# Patient Record
Sex: FEMALE | Race: WHITE | NOT HISPANIC OR LATINO | Employment: FULL TIME
[De-identification: names, ages, dates, MRNs, and addresses within clinical notes are randomized per-mention and may not be internally consistent; named-entity substitution may affect disease eponyms.]

---

## 2017-02-16 ENCOUNTER — HOSPITAL (OUTPATIENT)
Dept: OTHER | Age: 62
End: 2017-02-16
Attending: NEUROLOGICAL SURGERY

## 2017-03-01 ENCOUNTER — HOSPITAL (OUTPATIENT)
Dept: OTHER | Age: 62
End: 2017-03-01
Attending: NEUROLOGICAL SURGERY

## 2017-04-01 ENCOUNTER — HOSPITAL (OUTPATIENT)
Dept: OTHER | Age: 62
End: 2017-04-01
Attending: NEUROLOGICAL SURGERY

## 2017-05-01 ENCOUNTER — HOSPITAL (OUTPATIENT)
Dept: OTHER | Age: 62
End: 2017-05-01
Attending: NEUROLOGICAL SURGERY

## 2017-06-01 ENCOUNTER — HOSPITAL (OUTPATIENT)
Dept: OTHER | Age: 62
End: 2017-06-01
Attending: NEUROLOGICAL SURGERY

## 2017-06-02 ENCOUNTER — HOSPITAL (OUTPATIENT)
Dept: OTHER | Age: 62
End: 2017-06-02
Attending: INTERNAL MEDICINE

## 2021-01-07 ENCOUNTER — LAB ENCOUNTER (OUTPATIENT)
Dept: LAB | Facility: HOSPITAL | Age: 66
End: 2021-01-07
Attending: SURGERY
Payer: COMMERCIAL

## 2021-01-07 ENCOUNTER — OFFICE VISIT (OUTPATIENT)
Dept: SURGERY | Facility: CLINIC | Age: 66
End: 2021-01-07
Payer: COMMERCIAL

## 2021-01-07 VITALS — HEIGHT: 65 IN | BODY MASS INDEX: 26.66 KG/M2 | WEIGHT: 160 LBS

## 2021-01-07 DIAGNOSIS — C50.911 MALIGNANT NEOPLASM OF RIGHT FEMALE BREAST, UNSPECIFIED ESTROGEN RECEPTOR STATUS, UNSPECIFIED SITE OF BREAST (HCC): Primary | ICD-10-CM

## 2021-01-07 DIAGNOSIS — C50.911 MALIGNANT NEOPLASM OF RIGHT FEMALE BREAST, UNSPECIFIED ESTROGEN RECEPTOR STATUS, UNSPECIFIED SITE OF BREAST (HCC): ICD-10-CM

## 2021-01-07 LAB
ALBUMIN SERPL-MCNC: 3.9 G/DL (ref 3.4–5)
ALBUMIN/GLOB SERPL: 0.9 {RATIO} (ref 1–2)
ALP LIVER SERPL-CCNC: 98 U/L
ALT SERPL-CCNC: 21 U/L
ANION GAP SERPL CALC-SCNC: 5 MMOL/L (ref 0–18)
AST SERPL-CCNC: 11 U/L (ref 15–37)
BILIRUB SERPL-MCNC: 0.4 MG/DL (ref 0.1–2)
BUN BLD-MCNC: 27 MG/DL (ref 7–18)
BUN/CREAT SERPL: 32.9 (ref 10–20)
CALCIUM BLD-MCNC: 9.5 MG/DL (ref 8.5–10.1)
CHLORIDE SERPL-SCNC: 108 MMOL/L (ref 98–112)
CO2 SERPL-SCNC: 28 MMOL/L (ref 21–32)
CREAT BLD-MCNC: 0.82 MG/DL
GLOBULIN PLAS-MCNC: 4.2 G/DL (ref 2.8–4.4)
GLUCOSE BLD-MCNC: 95 MG/DL (ref 70–99)
M PROTEIN MFR SERPL ELPH: 8.1 G/DL (ref 6.4–8.2)
OSMOLALITY SERPL CALC.SUM OF ELEC: 297 MOSM/KG (ref 275–295)
PATIENT FASTING Y/N/NP: NO
POTASSIUM SERPL-SCNC: 4 MMOL/L (ref 3.5–5.1)
SODIUM SERPL-SCNC: 141 MMOL/L (ref 136–145)

## 2021-01-07 PROCEDURE — 36415 COLL VENOUS BLD VENIPUNCTURE: CPT

## 2021-01-07 PROCEDURE — 80053 COMPREHEN METABOLIC PANEL: CPT

## 2021-01-07 PROCEDURE — 99245 OFF/OP CONSLTJ NEW/EST HI 55: CPT | Performed by: SURGERY

## 2021-01-07 PROCEDURE — 3008F BODY MASS INDEX DOCD: CPT | Performed by: SURGERY

## 2021-01-07 PROCEDURE — 82652 VIT D 1 25-DIHYDROXY: CPT

## 2021-01-07 NOTE — H&P
Chief complaint: Patient presents with:  Breast Cancer: Referred by Dr. Annabelle Caldwell for R BR CA      HPI: Valerie Estrada presents for consultation related to newly diagnosed breat ca  R breast 1:00 pos  Invasive ductal  ER Pr pos, Her 2 katiana neg, Ki 67 favorable lesions  EYES:denies blurred vision or double vision  HEENT: denies new nasal congestion, sinus pain or ST  LUNGS: denies shortness of breath with exertion  CARDIOVASCULAR: denies chest pain on exertion  GI: no hematemesis, no BRBPR, no worsening heartburn discussed her diagnosis breast cancer. We discussed the diagnosis, further work-up, treatment options, genetic risks and potential work up, surgical options including breast conservation and breast reconstruction, adjuvant treatment.   We discussed the risk

## 2021-01-07 NOTE — PROCEDURES
Procedure Note  The risks, benefits, alternatives and expected recovery were explained. The pt expressed understanding and wished to proceed with the procedure.       Preop: Right breast mass  Postop:  Same  Procedure: Excision of Right breast mass  Anesth

## 2021-01-08 ENCOUNTER — TELEPHONE (OUTPATIENT)
Dept: HEMATOLOGY/ONCOLOGY | Facility: HOSPITAL | Age: 66
End: 2021-01-08

## 2021-01-08 NOTE — TELEPHONE ENCOUNTER
Patient phoned asking for assistance with breast MRI scheduling. Patient was seen in the office of Dr. Rigoberto Leventhal previous evening for management of a new right breast cancer.   Patient shares she was diagnosed through the [address] [place] [address] system and was referred for her records as well as insurance authorization. Shared with patient that I am in contact with Dr.Kosik Sagar's RN regarding these concerns, and will contact patient for scheduling when able.     Patient has my contact information and I have enco

## 2021-01-09 LAB — 1,25-DIHYDROXYVITAMIN D: 70.2 PG/ML

## 2021-01-11 ENCOUNTER — TELEPHONE (OUTPATIENT)
Dept: HEMATOLOGY/ONCOLOGY | Facility: HOSPITAL | Age: 66
End: 2021-01-11

## 2021-01-11 NOTE — TELEPHONE ENCOUNTER
Phoned patient for continued care coordination. Shared with patient that insurance authorization is still pending on her breast MRI.   I have tentatively scheduled the exam for Friday 1/15/21 at 6pm.    Patient aware of my follow up this week regarding ins

## 2021-01-12 ENCOUNTER — NURSE NAVIGATOR ENCOUNTER (OUTPATIENT)
Dept: HEMATOLOGY/ONCOLOGY | Facility: HOSPITAL | Age: 66
End: 2021-01-12

## 2021-01-12 ENCOUNTER — LAB ENCOUNTER (OUTPATIENT)
Dept: LAB | Facility: HOSPITAL | Age: 66
End: 2021-01-12
Attending: SURGERY
Payer: COMMERCIAL

## 2021-01-12 DIAGNOSIS — C50.911 INVASIVE DUCTAL CARCINOMA OF BREAST, RIGHT (HCC): ICD-10-CM

## 2021-01-12 DIAGNOSIS — C50.911 INVASIVE DUCTAL CARCINOMA OF BREAST, RIGHT (HCC): Primary | ICD-10-CM

## 2021-01-12 PROCEDURE — 88321 CONSLTJ&REPRT SLD PREP ELSWR: CPT

## 2021-01-12 NOTE — PROGRESS NOTES
Order placed for pathology slide review of outside pathology slides of the right breast core biopsy, performed at [facility], per protocol.

## 2021-01-13 ENCOUNTER — TELEPHONE (OUTPATIENT)
Dept: HEMATOLOGY/ONCOLOGY | Facility: HOSPITAL | Age: 66
End: 2021-01-13

## 2021-01-13 ENCOUNTER — TELEPHONE (OUTPATIENT)
Dept: SURGERY | Facility: CLINIC | Age: 66
End: 2021-01-13

## 2021-01-13 NOTE — TELEPHONE ENCOUNTER
The following e mail message was received from patient:    Stephanie Carrier,     Any word from [facility] regarding the MRI? Should I make a call myself? I am very anxious and I know that is not what I should be feeling when also trying to fight cancer.  Thank you fo

## 2021-01-13 NOTE — TELEPHONE ENCOUNTER
Phoned patient after receiving e mail communication. Patient tearful, and verbalizing her struggle with her diagnosis, her separation from her spouse currently in [facility], and concerns related to increasing premiums on her insurance.     Emotional support p

## 2021-01-15 ENCOUNTER — TELEPHONE (OUTPATIENT)
Dept: HEMATOLOGY/ONCOLOGY | Facility: HOSPITAL | Age: 66
End: 2021-01-15

## 2021-01-15 ENCOUNTER — HOSPITAL ENCOUNTER (OUTPATIENT)
Dept: MRI IMAGING | Facility: HOSPITAL | Age: 66
Discharge: HOME | End: 2021-01-15
Attending: SURGERY
Payer: COMMERCIAL

## 2021-01-15 DIAGNOSIS — C50.911 MALIGNANT NEOPLASM OF RIGHT FEMALE BREAST, UNSPECIFIED ESTROGEN RECEPTOR STATUS, UNSPECIFIED SITE OF BREAST (HCC): ICD-10-CM

## 2021-01-15 PROCEDURE — A9575 INJ GADOTERATE MEGLUMI 0.1ML: HCPCS | Performed by: SURGERY

## 2021-01-15 PROCEDURE — 77049 MRI BREAST C-+ W/CAD BI: CPT | Performed by: SURGERY

## 2021-01-15 NOTE — TELEPHONE ENCOUNTER
Many back and fourth calls between myself, the patient and her insurance carrier [employer] working towards authorization for breast MRI. Authorization was obtained after the efforts of myself, patient and the  in the [facility].       Aut

## 2021-01-18 ENCOUNTER — OFFICE VISIT (OUTPATIENT)
Dept: SURGERY | Facility: CLINIC | Age: 66
End: 2021-01-18
Payer: COMMERCIAL

## 2021-01-18 VITALS — BODY MASS INDEX: 26.66 KG/M2 | WEIGHT: 160 LBS | HEIGHT: 65 IN

## 2021-01-18 DIAGNOSIS — C50.911 MALIGNANT NEOPLASM OF RIGHT FEMALE BREAST, UNSPECIFIED ESTROGEN RECEPTOR STATUS, UNSPECIFIED SITE OF BREAST (HCC): Primary | ICD-10-CM

## 2021-01-18 PROCEDURE — 3008F BODY MASS INDEX DOCD: CPT | Performed by: SURGERY

## 2021-01-18 PROCEDURE — 99214 OFFICE O/P EST MOD 30 MIN: CPT | Performed by: SURGERY

## 2021-01-18 NOTE — H&P
Chief complaint: Patient presents with:  Breast Cancer:  Follow up B BR MRI for R BR CA      HPI: Zeeshan Ortega presents for consultation related to newly diagnosed right breast ca  R breast 1:00 pos  Invasive ductal  ER Pr pos, Her 2 katiana neg, Ki 67 favorable  Bx p generally well  SKIN: no ulcerated or worrisome skin lesions  EYES:denies blurred vision or double vision  HEENT: denies new nasal congestion, sinus pain or ST  LUNGS: denies shortness of breath with exertion  CARDIOVASCULAR: denies chest pain on exertion Cancer    Plan R needle loc lumpectomy and SN procedure. Today the patient and I discussed her diagnosis breast cancer.  We discussed the diagnosis, further work-up, treatment options, genetic risks and potential work up, surgical options including carla

## 2021-01-26 ENCOUNTER — LAB ENCOUNTER (OUTPATIENT)
Dept: LAB | Age: 66
End: 2021-01-26
Attending: SURGERY
Payer: COMMERCIAL

## 2021-01-26 DIAGNOSIS — Z01.818 PREOP TESTING: ICD-10-CM

## 2021-01-27 ENCOUNTER — OFFICE VISIT (OUTPATIENT)
Dept: HEMATOLOGY/ONCOLOGY | Facility: HOSPITAL | Age: 66
End: 2021-01-27
Attending: INTERNAL MEDICINE
Payer: COMMERCIAL

## 2021-01-27 ENCOUNTER — TELEPHONE (OUTPATIENT)
Dept: HEMATOLOGY/ONCOLOGY | Facility: HOSPITAL | Age: 66
End: 2021-01-27

## 2021-01-27 VITALS
OXYGEN SATURATION: 98 % | WEIGHT: 158 LBS | BODY MASS INDEX: 26.33 KG/M2 | HEIGHT: 65 IN | RESPIRATION RATE: 16 BRPM | SYSTOLIC BLOOD PRESSURE: 124 MMHG | HEART RATE: 66 BPM | DIASTOLIC BLOOD PRESSURE: 74 MMHG | TEMPERATURE: 97 F

## 2021-01-27 DIAGNOSIS — C50.211 MALIGNANT NEOPLASM OF UPPER-INNER QUADRANT OF RIGHT BREAST IN FEMALE, ESTROGEN RECEPTOR POSITIVE (HCC): Primary | ICD-10-CM

## 2021-01-27 DIAGNOSIS — Z17.0 MALIGNANT NEOPLASM OF UPPER-INNER QUADRANT OF RIGHT BREAST IN FEMALE, ESTROGEN RECEPTOR POSITIVE (HCC): Primary | ICD-10-CM

## 2021-01-27 LAB — SARS-COV-2 RNA RESP QL NAA+PROBE: NOT DETECTED

## 2021-01-27 PROCEDURE — 99245 OFF/OP CONSLTJ NEW/EST HI 55: CPT | Performed by: INTERNAL MEDICINE

## 2021-01-27 NOTE — CONSULTS
HPI     Bette Mckenzie is a 60year old female here at the request of Stuart Pelayo MD, due to new diagnosis of Malignant neoplasm of upper-inner quadrant of right breast in female, estrogen receptor positive (hcc)  (primary encounter diagnosis)     Pa Breast density category C. Prior history of breast biopsies:  No.     ECOG PS 0      She is accompanied by herself      Review of Systems:   Review of Systems   Constitutional: Positive for fatigue.  Negative for appetite change and unexpected weight gomez Patient currently staying with Dr. Severino Corrales, who is a good friend. Patient's  is in [facility] and she is trying to get him to come and be with her. Patient staying with her daughters. Youngest son is a Fabiano in college in [place].       Family Hi Surgical options were reviewed. Discussed that she would be an excellent candidate for breast conservation with a lumpectomy followed by RT to complete local control.   Given her positive family history of breast cancer, genetic counseling was recommended, Communicated with Dr. Deann Kidd about genetic testing prior to surgery as she is scheduled for tomorrow. No orders of the defined types were placed in this encounter.     MDM High risk    Results From Past 48 Hours:  Recent Results (from the past 48 hour Estrogen receptor (Leica, monoclonal, clone 6 F11) status: 95% (Positive, strong intensity)  Progesterone receptor (Leica, monoclonal, clone 16) status: 90% (Positive, strong  intensity)  HER-2/katiana (Leica, monoclonal, clone CB11) status: 0 (Negative)  Ki-6 IV administration of 15 ml of Dotarem. Subsequently, subtraction imaging and 3D reconstruction were completed on an independent workstation. ENHANCEMENT PATTERN:   Mild, with 25-50% of glandular tissue demonstrating enhancement.                  Sarina Chamber Dictated by (CST): Keny Perales DO on 1/18/2021 at 10:12 AM       Finalized by (CST): Gita [facility],  on 1/18/2021 at 10:34 AM          IMPRESSION:   Right breast focal asymmetry.      RECOMMENDATION: Additional diagnostic COMPARISON: 12/27/2013 through 6/29/2010      FINDINGS:   Patient underwent recent screening and diagnostic imaging. Slightly more  conspicuous appearance of focal asymmetry central inner right breast   allowing  for technique.  Parenchymal pattern bilatera Invasive Duct Carcinoma, Histologic Grade II/III (SBR)    Please refer to pathology report for further details. IMPRESSION:  Malignant pathology results detailed above. Surgical consultation   recommended  for excision.  Staging breast MRI is also consuelo Patient tolerated the procedure well without any immediate postprocedural  complications. The postbiopsy marking clip in the shape of a coil was deployed  at the biopsy site.  Extrinsic compression was applied directly over the biopsy  site until adequate h risks and complications include but are not limited to bleeding, infection,  hematoma formation, unsuccessful biopsy, clip migration and potential for  surgical intervention. This was discussed with the patient.  Patient voiced  understanding and agrees to Breast Prognostic - Ancillary Studies:- Estrogen receptors (ER):Positive (95% of   nuclear stain of malignant cells)     Intensity: strong  -Progesterone receptors (NJ): Positive (90% of nuclear stain of malignant cells)      Intensity: strong  -Ki-67 (Mib been cleared or approved by the [employer] and Drug Administration. The FDA has   determined that such clearance or approval  is not necessary. The test is used for clinical purposes. It should not be   regarded as investigational or for research.   This labor clones: ER-SP1, DC-1E2, HER2-4B5, Ki67-K2, p53-DO7, EGFR-3C6.         Electronically Signed By: Mayra Medeiros D.O. (SARAN) KELECHI 1/4/2021            Preoperative Diagnosis: Abnormal findings on diagnostic imaging of breast      Gross Description: RT aguirre

## 2021-01-27 NOTE — TELEPHONE ENCOUNTER
Phoned patient regarding referral for genetic testing from Dr. Dee Dee Lopez. Scheduled patient to meet with Kori Mendosa for tomorrow at 10:15am.  All appointment Information provided to patient. She acknowledged and denied questions.

## 2021-01-28 ENCOUNTER — GENETICS ENCOUNTER (OUTPATIENT)
Dept: GENETICS | Facility: HOSPITAL | Age: 66
End: 2021-01-28
Attending: INTERNAL MEDICINE
Payer: COMMERCIAL

## 2021-01-28 ENCOUNTER — NURSE ONLY (OUTPATIENT)
Dept: HEMATOLOGY/ONCOLOGY | Facility: HOSPITAL | Age: 66
End: 2021-01-28
Payer: COMMERCIAL

## 2021-01-28 PROCEDURE — 96040 HC GENETIC COUNSELING EA 30 MIN: CPT

## 2021-01-28 PROCEDURE — 36415 COLL VENOUS BLD VENIPUNCTURE: CPT

## 2021-01-28 NOTE — PROGRESS NOTES
Patient Name: Brenda Butcher  YOB: 1955  Date of Visit: 1/28/2021    Reason for visit: Ms. Sam Burgos was seen for the purposes of genetic counseling due to her recent diagnosis of breast at age 60 and a family history of breast cancer.  The purpose possible gynecological cancer at an unknown age, respectively. Another maternal female cousin (74) was diagnosed with lung cancer in her 49-58s (daughter of maternal aunt with nipple cancer).  A maternal male cousin possibly had an unknown cancer (son of un occur in both tumor suppressor genes of a particular cell during the course of their lifetime in order for malignant transformation to occur.   In individuals with hereditary breast or other related cancers, one of the tumor suppressor genes already carries cancers associated with the specific gene. Since mutations in most cancer susceptibility genes are inherited in an autosomal dominant fashion, siblings and children of individuals with a mutation have a 50% risk of carrying the mutation as well.       A neg individual tested is truly negative for the familial mutation or whether the familial mutation was unable to be detected by the genetic testing method used. In such cases, screening and follow-up should be guided by personal and family history.      Because

## 2021-01-29 ENCOUNTER — TELEPHONE (OUTPATIENT)
Dept: HEMATOLOGY/ONCOLOGY | Facility: HOSPITAL | Age: 66
End: 2021-01-29

## 2021-01-29 DIAGNOSIS — C50.911 MALIGNANT NEOPLASM OF RIGHT FEMALE BREAST, UNSPECIFIED ESTROGEN RECEPTOR STATUS, UNSPECIFIED SITE OF BREAST (HCC): Primary | ICD-10-CM

## 2021-01-29 NOTE — TELEPHONE ENCOUNTER
Call received from patient. She shares that after \"much thought and discussion with friends and family\", that she wishes to proceed with lumpectomy. Patient understands that genetic testing is pending.   She shares that should she be found to have a m

## 2021-02-01 ENCOUNTER — TELEPHONE (OUTPATIENT)
Dept: SURGERY | Facility: CLINIC | Age: 66
End: 2021-02-01

## 2021-02-01 ENCOUNTER — SOCIAL WORK SERVICES (OUTPATIENT)
Dept: HEMATOLOGY/ONCOLOGY | Facility: HOSPITAL | Age: 66
End: 2021-02-01

## 2021-02-01 NOTE — PROGRESS NOTES
VARGHESE spoke with the pt, along with RN Breast Navigator. Pt is requesting a letter to assist with expediting her passport application.  Prior to discovery of her breast cancer, pt intended to spend the spring in [facility] with her  who lives there [place] [facility]\" today. SW will f/u with the pt tomorrow to review what information is being requested. --  02/02: SW placed call to the pt, as previously planned. Message left, awaiting response.   --  02/03: Pt left voicemail indicating she has no upda

## 2021-02-02 DIAGNOSIS — Z17.0 MALIGNANT NEOPLASM OF UPPER-INNER QUADRANT OF RIGHT BREAST IN FEMALE, ESTROGEN RECEPTOR POSITIVE (HCC): Primary | ICD-10-CM

## 2021-02-02 DIAGNOSIS — C50.211 MALIGNANT NEOPLASM OF UPPER-INNER QUADRANT OF RIGHT BREAST IN FEMALE, ESTROGEN RECEPTOR POSITIVE (HCC): Primary | ICD-10-CM

## 2021-02-05 ENCOUNTER — APPOINTMENT (OUTPATIENT)
Dept: PHYSICAL THERAPY | Facility: HOSPITAL | Age: 66
End: 2021-02-05
Attending: SURGERY
Payer: COMMERCIAL

## 2021-02-05 ENCOUNTER — OFFICE VISIT (OUTPATIENT)
Dept: SURGERY | Facility: CLINIC | Age: 66
End: 2021-02-05
Payer: COMMERCIAL

## 2021-02-05 VITALS — WEIGHT: 158 LBS | HEIGHT: 65 IN | BODY MASS INDEX: 26.33 KG/M2

## 2021-02-05 DIAGNOSIS — C50.911 MALIGNANT NEOPLASM OF RIGHT FEMALE BREAST, UNSPECIFIED ESTROGEN RECEPTOR STATUS, UNSPECIFIED SITE OF BREAST (HCC): Primary | ICD-10-CM

## 2021-02-05 PROCEDURE — 99215 OFFICE O/P EST HI 40 MIN: CPT | Performed by: SURGERY

## 2021-02-05 PROCEDURE — 3008F BODY MASS INDEX DOCD: CPT | Performed by: SURGERY

## 2021-02-05 RX ORDER — VALACYCLOVIR HYDROCHLORIDE 500 MG/1
500 TABLET, FILM COATED ORAL DAILY
COMMUNITY
Start: 2021-01-18 | End: 2021-03-11

## 2021-02-05 NOTE — H&P
Chief complaint: R breast cancer    HPI: Cliff Benson presents today with her daughter Yanet Ramírez, after Ирина's consultation with Dr. Dee Dee Lopez regarding pt's right breast ca.     R breast 1:00 pos  Invasive ductal  ER Pr pos, Her 2 katiana neg, Ki 67 favorable  Bx performed at [facility]      Alcohol use: Yes        Comment: socially      Drug use: No       Family history:  Family History   Problem Relation Age of Onset   • Heart Disorder Father    • Stroke Father    • Breast Cancer Mother 46   • Heart Disorder Brother    • organomegaly noted, no masses, no hernias, no ascites. Extremities: no edema, cyanosis, or clubbing. No deformity. Musculoskeletal: normal appearance, no deformities, normal function. Back wnl, no CVA tenderness.   Neurological: exam appropriate for age r

## 2021-02-05 NOTE — H&P (VIEW-ONLY)
Chief complaint: R breast cancer    HPI: Adonay Sanchez presents today with her daughter Fan Acosta, after Ирниа's consultation with Dr. Watson Pastures regarding pt's right breast ca.     R breast 1:00 pos  Invasive ductal  ER Pr pos, Her 2 katiana neg, Ki 67 favorable  Bx performed at [facility]      Alcohol use: Yes        Comment: socially      Drug use: No       Family history:  Family History   Problem Relation Age of Onset   • Heart Disorder Father    • Stroke Father    • Breast Cancer Mother 46   • Heart Disorder Brother    • organomegaly noted, no masses, no hernias, no ascites. Extremities: no edema, cyanosis, or clubbing. No deformity. Musculoskeletal: normal appearance, no deformities, normal function. Back wnl, no CVA tenderness.   Neurological: exam appropriate for age r

## 2021-02-08 ENCOUNTER — TELEPHONE (OUTPATIENT)
Dept: HEMATOLOGY/ONCOLOGY | Facility: HOSPITAL | Age: 66
End: 2021-02-08

## 2021-02-08 ENCOUNTER — NURSE NAVIGATOR ENCOUNTER (OUTPATIENT)
Dept: HEMATOLOGY/ONCOLOGY | Facility: HOSPITAL | Age: 66
End: 2021-02-08

## 2021-02-08 NOTE — TELEPHONE ENCOUNTER
Called the patient to clarify several of her questions. D/w patient that the biopsy that she had did not make her cancer spread or grow.     D/w the patient that her genetic tests were negative and that she is a great candidate for a lumpectomy with slnb

## 2021-02-08 NOTE — TELEPHONE ENCOUNTER
Ms. Michael Nails was informed of STAT genetic test results via telephone on 2/8/2021.  was negative for any pathogenic mutations in the 9 genes evaluated in the [facility] Breast Cancer STAT Panel (JOHNATHAN, BRCA1, BRCA2, CDH1, CHEK2, PALB2, PTEN, STK11, TP53).  A

## 2021-02-08 NOTE — TELEPHONE ENCOUNTER
The following email message was received from patient on 2/8/21 at 3:50pm:  As I mentioned this morning at 8 AM, I have been very disappointed about the delay in my lumpectomy.  While I understand Dr. Dee Dee Lopez was looking at the longer-term, bigger picture of ge during which she relayed her wish to proceed with scheduling lumpectomy regardless of the genetic testing outcome, that this was also communicated with her Care Team to include Dr. Sarah Monreal, her RN Malachi Giles, as well as Dr. Kimo Child.     Patient shares she spoke wit

## 2021-02-08 NOTE — PROGRESS NOTES
Received phone call from pt stating that she spoke to Dr. Neptali Garcia and they both agreed that pt's lumpectomy should be moved closer with her surgical date.   Allowed pt to vent frustrations about her surgical date stating that she spoke with Dr. Neptali Garcia stating

## 2021-02-09 ENCOUNTER — TELEPHONE (OUTPATIENT)
Dept: HEMATOLOGY/ONCOLOGY | Facility: HOSPITAL | Age: 66
End: 2021-02-09

## 2021-02-09 ENCOUNTER — OFFICE VISIT (OUTPATIENT)
Dept: PHYSICAL THERAPY | Facility: HOSPITAL | Age: 66
End: 2021-02-09
Payer: COMMERCIAL

## 2021-02-09 NOTE — TELEPHONE ENCOUNTER
Tried to call patient with results of reflex genetic testing, patient didn't answer and I was unable to LM due to full VM inbox. Will try to call patient tomorrow.

## 2021-02-09 NOTE — PATIENT INSTRUCTIONS
Instructions:    1. CALL INSURANCE AND ASK WHAT VENDOR TO USE FOR RIGHT UPPER EXTREMITY COMPRESSION SLEEVE AND GAUNTLET (These are considered DME Equipment)    2.  IF [employer] and [address] [place] [address] call them and ask how long it would take to get an OFF THE SHELF or YOON

## 2021-02-09 NOTE — PROGRESS NOTES
BREAST CANCER SURGICAL SCREENINGS  [facility]      PATIENT SUMMARY:     Involved Side:   RIGHT                                                 Dominant Hand:   RIGHT                               Occupation:   Writer, helping with in- Shoulder strength  Right Left  Shoulder strength  Right Left  Shoulder strength  Right Left    flex 5 5   flex     flex      abd 5 5   abd     abd      ext 5 5   ext     ext      ER 5 5   ER     ER      IR 5 5   IR     IR

## 2021-02-10 ENCOUNTER — TELEPHONE (OUTPATIENT)
Dept: SURGERY | Facility: CLINIC | Age: 66
End: 2021-02-10

## 2021-02-10 ENCOUNTER — GENETICS ENCOUNTER (OUTPATIENT)
Dept: HEMATOLOGY/ONCOLOGY | Facility: HOSPITAL | Age: 66
End: 2021-02-10

## 2021-02-10 DIAGNOSIS — C50.911 MALIGNANT NEOPLASM OF RIGHT FEMALE BREAST, UNSPECIFIED ESTROGEN RECEPTOR STATUS, UNSPECIFIED SITE OF BREAST (HCC): Primary | ICD-10-CM

## 2021-02-10 NOTE — PROGRESS NOTES
Patient Name: Ender Bhatt  YOB: 1955    Referring Provider:  Nehemiah Gregory MD          Reason for Referral:  Ms. Jacy Damian had STAT genetic testing performed on 1/28/2021 because of a recent diagnosis of breast cancer and a family history of start at an earlier age than recommended for the general population. All medical management decisions should be made with a physician. Ms. Deloris Luo was found to have a FLCN [id number]>A ([id number]) VUS.  A variant of uncertain significance (VUS) means that

## 2021-02-12 RX ORDER — ACETAMINOPHEN 500 MG
500 TABLET ORAL EVERY 6 HOURS PRN
COMMUNITY
End: 2021-04-16

## 2021-02-13 ENCOUNTER — LAB ENCOUNTER (OUTPATIENT)
Dept: LAB | Facility: HOSPITAL | Age: 66
End: 2021-02-13
Attending: SURGERY
Payer: COMMERCIAL

## 2021-02-13 DIAGNOSIS — Z01.818 PREOP TESTING: ICD-10-CM

## 2021-02-13 LAB — SARS-COV-2 RNA RESP QL NAA+PROBE: NOT DETECTED

## 2021-02-15 ENCOUNTER — TELEPHONE (OUTPATIENT)
Dept: HEMATOLOGY/ONCOLOGY | Facility: HOSPITAL | Age: 66
End: 2021-02-15

## 2021-02-15 NOTE — TELEPHONE ENCOUNTER
Patient is scheduled 2/16/21 for right breast wire localized lumpectomy and sentinel lymph node biopsy with Dr. Kamini Crane. Phoned patient to provide emotional support as well as address any questions or concerns related to her scheduled surgery.     [facility]

## 2021-02-16 ENCOUNTER — HOSPITAL ENCOUNTER (OUTPATIENT)
Facility: HOSPITAL | Age: 66
Setting detail: HOSPITAL OUTPATIENT SURGERY
Discharge: HOME | End: 2021-02-16
Attending: SURGERY | Admitting: SURGERY
Payer: COMMERCIAL

## 2021-02-16 ENCOUNTER — ANESTHESIA EVENT (OUTPATIENT)
Dept: SURGERY | Facility: HOSPITAL | Age: 66
End: 2021-02-16
Payer: COMMERCIAL

## 2021-02-16 ENCOUNTER — ANESTHESIA (OUTPATIENT)
Dept: SURGERY | Facility: HOSPITAL | Age: 66
End: 2021-02-16
Payer: COMMERCIAL

## 2021-02-16 ENCOUNTER — APPOINTMENT (OUTPATIENT)
Dept: MAMMOGRAPHY | Facility: HOSPITAL | Age: 66
End: 2021-02-16
Attending: SURGERY
Payer: COMMERCIAL

## 2021-02-16 ENCOUNTER — HOSPITAL ENCOUNTER (OUTPATIENT)
Dept: ULTRASOUND IMAGING | Facility: HOSPITAL | Age: 66
Discharge: HOME | End: 2021-02-16
Attending: SURGERY
Payer: COMMERCIAL

## 2021-02-16 ENCOUNTER — HOSPITAL ENCOUNTER (OUTPATIENT)
Dept: MAMMOGRAPHY | Facility: HOSPITAL | Age: 66
Discharge: HOME | End: 2021-02-16
Attending: SURGERY
Payer: COMMERCIAL

## 2021-02-16 ENCOUNTER — HOSPITAL ENCOUNTER (OUTPATIENT)
Dept: NUCLEAR MEDICINE | Facility: HOSPITAL | Age: 66
Discharge: HOME | End: 2021-02-16
Attending: SURGERY
Payer: COMMERCIAL

## 2021-02-16 VITALS
TEMPERATURE: 98 F | RESPIRATION RATE: 16 BRPM | SYSTOLIC BLOOD PRESSURE: 140 MMHG | OXYGEN SATURATION: 98 % | BODY MASS INDEX: 26.66 KG/M2 | WEIGHT: 160 LBS | HEIGHT: 65 IN | HEART RATE: 72 BPM | DIASTOLIC BLOOD PRESSURE: 73 MMHG

## 2021-02-16 DIAGNOSIS — Z01.818 PREOP TESTING: Primary | ICD-10-CM

## 2021-02-16 DIAGNOSIS — C50.911 MALIGNANT NEOPLASM OF RIGHT FEMALE BREAST, UNSPECIFIED ESTROGEN RECEPTOR STATUS, UNSPECIFIED SITE OF BREAST (HCC): ICD-10-CM

## 2021-02-16 PROCEDURE — 19285 PERQ DEV BREAST 1ST US IMAG: CPT | Performed by: SURGERY

## 2021-02-16 PROCEDURE — A4648 IMPLANTABLE TISSUE MARKER: HCPCS

## 2021-02-16 PROCEDURE — 19301 PARTIAL MASTECTOMY: CPT | Performed by: SURGERY

## 2021-02-16 PROCEDURE — 78195 LYMPH SYSTEM IMAGING: CPT | Performed by: SURGERY

## 2021-02-16 PROCEDURE — 38525 BIOPSY/REMOVAL LYMPH NODES: CPT | Performed by: SURGERY

## 2021-02-16 PROCEDURE — 38900 IO MAP OF SENT LYMPH NODE: CPT | Performed by: SURGERY

## 2021-02-16 PROCEDURE — 0HBT0ZZ EXCISION OF RIGHT BREAST, OPEN APPROACH: ICD-10-PCS | Performed by: SURGERY

## 2021-02-16 PROCEDURE — 76098 X-RAY EXAM SURGICAL SPECIMEN: CPT | Performed by: SURGERY

## 2021-02-16 PROCEDURE — 77065 DX MAMMO INCL CAD UNI: CPT | Performed by: SURGERY

## 2021-02-16 RX ORDER — SODIUM CHLORIDE, SODIUM LACTATE, POTASSIUM CHLORIDE, CALCIUM CHLORIDE 600; 310; 30; 20 MG/100ML; MG/100ML; MG/100ML; MG/100ML
INJECTION, SOLUTION INTRAVENOUS CONTINUOUS
Status: DISCONTINUED | OUTPATIENT
Start: 2021-02-16 | End: 2021-02-16

## 2021-02-16 RX ORDER — MORPHINE SULFATE 10 MG/ML
6 INJECTION, SOLUTION INTRAMUSCULAR; INTRAVENOUS EVERY 10 MIN PRN
Status: DISCONTINUED | OUTPATIENT
Start: 2021-02-16 | End: 2021-02-16

## 2021-02-16 RX ORDER — MORPHINE SULFATE 4 MG/ML
4 INJECTION, SOLUTION INTRAMUSCULAR; INTRAVENOUS EVERY 10 MIN PRN
Status: DISCONTINUED | OUTPATIENT
Start: 2021-02-16 | End: 2021-02-16

## 2021-02-16 RX ORDER — MIDAZOLAM HYDROCHLORIDE 1 MG/ML
INJECTION INTRAMUSCULAR; INTRAVENOUS AS NEEDED
Status: DISCONTINUED | OUTPATIENT
Start: 2021-02-16 | End: 2021-02-16 | Stop reason: SURG

## 2021-02-16 RX ORDER — DEXAMETHASONE SODIUM PHOSPHATE 4 MG/ML
VIAL (ML) INJECTION AS NEEDED
Status: DISCONTINUED | OUTPATIENT
Start: 2021-02-16 | End: 2021-02-16 | Stop reason: SURG

## 2021-02-16 RX ORDER — HYDROCODONE BITARTRATE AND ACETAMINOPHEN 5; 325 MG/1; MG/1
1 TABLET ORAL EVERY 4 HOURS PRN
Status: CANCELLED | OUTPATIENT
Start: 2021-02-16

## 2021-02-16 RX ORDER — HYDROMORPHONE HYDROCHLORIDE 1 MG/ML
0.6 INJECTION, SOLUTION INTRAMUSCULAR; INTRAVENOUS; SUBCUTANEOUS EVERY 5 MIN PRN
Status: DISCONTINUED | OUTPATIENT
Start: 2021-02-16 | End: 2021-02-16

## 2021-02-16 RX ORDER — BUPIVACAINE HYDROCHLORIDE AND EPINEPHRINE 2.5; 5 MG/ML; UG/ML
INJECTION, SOLUTION INFILTRATION; PERINEURAL AS NEEDED
Status: DISCONTINUED | OUTPATIENT
Start: 2021-02-16 | End: 2021-02-16 | Stop reason: HOSPADM

## 2021-02-16 RX ORDER — MORPHINE SULFATE 4 MG/ML
6 INJECTION, SOLUTION INTRAMUSCULAR; INTRAVENOUS EVERY 2 HOUR PRN
Status: CANCELLED | OUTPATIENT
Start: 2021-02-16

## 2021-02-16 RX ORDER — HALOPERIDOL 5 MG/ML
0.25 INJECTION INTRAMUSCULAR ONCE AS NEEDED
Status: DISCONTINUED | OUTPATIENT
Start: 2021-02-16 | End: 2021-02-16

## 2021-02-16 RX ORDER — MORPHINE SULFATE 4 MG/ML
2 INJECTION, SOLUTION INTRAMUSCULAR; INTRAVENOUS EVERY 2 HOUR PRN
Status: CANCELLED | OUTPATIENT
Start: 2021-02-16

## 2021-02-16 RX ORDER — LIDOCAINE HYDROCHLORIDE 10 MG/ML
INJECTION, SOLUTION EPIDURAL; INFILTRATION; INTRACAUDAL; PERINEURAL AS NEEDED
Status: DISCONTINUED | OUTPATIENT
Start: 2021-02-16 | End: 2021-02-16 | Stop reason: SURG

## 2021-02-16 RX ORDER — NALOXONE HYDROCHLORIDE 0.4 MG/ML
80 INJECTION, SOLUTION INTRAMUSCULAR; INTRAVENOUS; SUBCUTANEOUS AS NEEDED
Status: DISCONTINUED | OUTPATIENT
Start: 2021-02-16 | End: 2021-02-16

## 2021-02-16 RX ORDER — ONDANSETRON 2 MG/ML
4 INJECTION INTRAMUSCULAR; INTRAVENOUS ONCE AS NEEDED
Status: COMPLETED | OUTPATIENT
Start: 2021-02-16 | End: 2021-02-16

## 2021-02-16 RX ORDER — HYDROCODONE BITARTRATE AND ACETAMINOPHEN 5; 325 MG/1; MG/1
1 TABLET ORAL EVERY 6 HOURS PRN
Qty: 20 TABLET | Refills: 0 | Status: SHIPPED | OUTPATIENT
Start: 2021-02-16 | End: 2021-04-05 | Stop reason: ALTCHOICE

## 2021-02-16 RX ORDER — ONDANSETRON 2 MG/ML
INJECTION INTRAMUSCULAR; INTRAVENOUS AS NEEDED
Status: DISCONTINUED | OUTPATIENT
Start: 2021-02-16 | End: 2021-02-16 | Stop reason: SURG

## 2021-02-16 RX ORDER — HYDROCODONE BITARTRATE AND ACETAMINOPHEN 5; 325 MG/1; MG/1
1 TABLET ORAL EVERY 6 HOURS PRN
Qty: 6 TABLET | Refills: 0 | Status: SHIPPED | OUTPATIENT
Start: 2021-02-16 | End: 2021-03-02

## 2021-02-16 RX ORDER — HYDROCODONE BITARTRATE AND ACETAMINOPHEN 5; 325 MG/1; MG/1
2 TABLET ORAL EVERY 4 HOURS PRN
Status: CANCELLED | OUTPATIENT
Start: 2021-02-16

## 2021-02-16 RX ORDER — CLINDAMYCIN PHOSPHATE 900 MG/50ML
900 INJECTION INTRAVENOUS ONCE
Status: COMPLETED | OUTPATIENT
Start: 2021-02-16 | End: 2021-02-16

## 2021-02-16 RX ORDER — ACETAMINOPHEN 325 MG/1
650 TABLET ORAL EVERY 4 HOURS PRN
Status: CANCELLED | OUTPATIENT
Start: 2021-02-16

## 2021-02-16 RX ORDER — PROCHLORPERAZINE EDISYLATE 5 MG/ML
5 INJECTION INTRAMUSCULAR; INTRAVENOUS ONCE AS NEEDED
Status: DISCONTINUED | OUTPATIENT
Start: 2021-02-16 | End: 2021-02-16

## 2021-02-16 RX ORDER — HYDROMORPHONE HYDROCHLORIDE 1 MG/ML
0.2 INJECTION, SOLUTION INTRAMUSCULAR; INTRAVENOUS; SUBCUTANEOUS EVERY 5 MIN PRN
Status: DISCONTINUED | OUTPATIENT
Start: 2021-02-16 | End: 2021-02-16

## 2021-02-16 RX ORDER — MORPHINE SULFATE 4 MG/ML
2 INJECTION, SOLUTION INTRAMUSCULAR; INTRAVENOUS EVERY 10 MIN PRN
Status: DISCONTINUED | OUTPATIENT
Start: 2021-02-16 | End: 2021-02-16

## 2021-02-16 RX ORDER — HYDROMORPHONE HYDROCHLORIDE 1 MG/ML
0.4 INJECTION, SOLUTION INTRAMUSCULAR; INTRAVENOUS; SUBCUTANEOUS EVERY 5 MIN PRN
Status: DISCONTINUED | OUTPATIENT
Start: 2021-02-16 | End: 2021-02-16

## 2021-02-16 RX ORDER — HYDROCODONE BITARTRATE AND ACETAMINOPHEN 5; 325 MG/1; MG/1
2 TABLET ORAL AS NEEDED
Status: COMPLETED | OUTPATIENT
Start: 2021-02-16 | End: 2021-02-16

## 2021-02-16 RX ORDER — MORPHINE SULFATE 4 MG/ML
4 INJECTION, SOLUTION INTRAMUSCULAR; INTRAVENOUS EVERY 2 HOUR PRN
Status: CANCELLED | OUTPATIENT
Start: 2021-02-16

## 2021-02-16 RX ORDER — ACETAMINOPHEN 500 MG
1000 TABLET ORAL ONCE
Status: COMPLETED | OUTPATIENT
Start: 2021-02-16 | End: 2021-02-16

## 2021-02-16 RX ORDER — HYDROCODONE BITARTRATE AND ACETAMINOPHEN 5; 325 MG/1; MG/1
1 TABLET ORAL AS NEEDED
Status: COMPLETED | OUTPATIENT
Start: 2021-02-16 | End: 2021-02-16

## 2021-02-16 RX ADMIN — SODIUM CHLORIDE, SODIUM LACTATE, POTASSIUM CHLORIDE, CALCIUM CHLORIDE: 600; 310; 30; 20 INJECTION, SOLUTION INTRAVENOUS at 11:11:00

## 2021-02-16 RX ADMIN — MIDAZOLAM HYDROCHLORIDE 2 MG: 1 INJECTION INTRAMUSCULAR; INTRAVENOUS at 11:10:00

## 2021-02-16 RX ADMIN — CLINDAMYCIN PHOSPHATE 900 MG: 900 INJECTION INTRAVENOUS at 11:20:00

## 2021-02-16 RX ADMIN — SODIUM CHLORIDE, SODIUM LACTATE, POTASSIUM CHLORIDE, CALCIUM CHLORIDE: 600; 310; 30; 20 INJECTION, SOLUTION INTRAVENOUS at 12:29:00

## 2021-02-16 RX ADMIN — LIDOCAINE HYDROCHLORIDE 50 MG: 10 INJECTION, SOLUTION EPIDURAL; INFILTRATION; INTRACAUDAL; PERINEURAL at 11:17:00

## 2021-02-16 RX ADMIN — DEXAMETHASONE SODIUM PHOSPHATE 4 MG: 4 MG/ML VIAL (ML) INJECTION at 11:30:00

## 2021-02-16 RX ADMIN — ONDANSETRON 4 MG: 2 INJECTION INTRAMUSCULAR; INTRAVENOUS at 12:25:00

## 2021-02-16 NOTE — IMAGING NOTE
0757 Pt  to ultrasound department scouts completed by Sally Ford us tech     4216 Hx taken procedure explained questions answered. Order verified and initialed by all staff members .      8395  Consent signed and verified      3021 Dr Prema Downs here scanning c

## 2021-02-16 NOTE — ANESTHESIA POSTPROCEDURE EVALUATION
Patient: Annel De Oliveira    Procedure Summary     Date: 02/16/21 Room / Location: [address] [place] [address] MAIN OR 03 / [facility] OR    Anesthesia Start: 1110 Anesthesia Stop:     Procedures:       BREAST LUMPECTOMY (Right Breast)      BREAST BIOPSY NEEDLE LOCALIZATION (Right Breas

## 2021-02-16 NOTE — PROCEDURES
[facility]  Procedure Note    Brenda Butcher Patient Status:  Outpatient    1955 MRN [id number]   [place] [address] Attending Jannette Garrison MD   Hosp Day # 0 PCP No primary care provider on file.      Procedure:

## 2021-02-16 NOTE — ANESTHESIA PREPROCEDURE EVALUATION
Anesthesia PreOp Note    HPI:     Mamta Roe is a 60year old female who presents for preoperative consultation requested by: April Hall MD    Date of Surgery: 2/16/2021    Procedure(s):  BREAST LUMPECTOMY  BREAST BIOPSY NEEDLE LOCALIZATION  Jacek Agrawal , Rfl: , 2/14/2021    •  Vitamin B-12 100 MCG Oral Tab, Take 250 mcg by mouth daily. , Disp: , Rfl: , 2/14/2021        •  clindamycin (CLEOCIN) in D5W 900mg/50ml premix, 900 mg, Intravenous, Once, Maeve Hua MD    •  lactated ringers infusion, , Int Emotionally abused: Not on file        Physically abused: Not on file        Forced sexual activity: Not on file    Other Topics      Concerns:        Not on file    Social History Narrative      Not on file      Available pre-op labs reviewed.      Lab Res anesthetic plan, benefits, risks including possible dental damage if relevant, major complications, and any alternative forms of anesthetic management. All of the patient's questions were answered to the best of my ability.  The patient desires the anesth

## 2021-02-16 NOTE — ANESTHESIA PROCEDURE NOTES
Airway  Date/Time: 2/16/2021 11:19 AM  Urgency: elective    Airway not difficult    General Information and Staff    Patient location during procedure: OR  Anesthesiologist: Mir Martinez MD  Resident/CRNA: Rosendo Miranda., CRNA  Performed: [facility]

## 2021-02-16 NOTE — INTERVAL H&P NOTE
Pre-op Diagnosis: Malignant neoplasm of right female breast, unspecified estrogen receptor status, unspecified site of breast [facility] [C50.911]    The above referenced H&P was reviewed by Key Goncalves MD on 2/16/2021, the patient was examined and no signi

## 2021-02-16 NOTE — OPERATIVE REPORT
Date of procedure:  2/16/2021    Pre-Operative Diagnosis: Right breast cancer  Post-Operative Diagnosis:  Same and 2 Right axillary sentinel nodes negative for malignancy on frozen section     Procedure Performed:    [place] node intraoperative injection, vivo and ex vivo and the axillary background count was obtained and was 8. The nodes were sent for frozen section. The right breast curvilinear incision was made in  hidden fashion at the superior areolar border.  The partial was performed, excising an adeq

## 2021-02-16 NOTE — OR PREOP
Patient assisted to sitting at side of bed. Felt dizzy, lightheaded and nauseous. Returned to semi-recumbent position and IV fluids reattached to IV site. Patient given Zofran for nausea. Will attempt to sit/stand again and assess readiness for discharge.

## 2021-02-18 ENCOUNTER — SOCIAL WORK SERVICES (OUTPATIENT)
Dept: HEMATOLOGY/ONCOLOGY | Facility: HOSPITAL | Age: 66
End: 2021-02-18

## 2021-02-18 NOTE — PROGRESS NOTES
SW received call from the pt requesting information on receiving a COVID-19 vaccine. She states her work is requiring this vaccine.  VARGHESE sent a message to the pt via [employer] detailing the [facility] rollout methods and how to update her occupation in [address] [place] [address] [address] to ref

## 2021-02-19 ENCOUNTER — NURSE NAVIGATOR ENCOUNTER (OUTPATIENT)
Dept: HEMATOLOGY/ONCOLOGY | Facility: HOSPITAL | Age: 66
End: 2021-02-19

## 2021-02-19 ENCOUNTER — TELEPHONE (OUTPATIENT)
Dept: HEMATOLOGY/ONCOLOGY | Facility: HOSPITAL | Age: 66
End: 2021-02-19

## 2021-02-19 NOTE — TELEPHONE ENCOUNTER
Patient returned call to Breast RN Navigator. Patient confirms receiving her surgical pathology results and discussed these with Dr. Sarah Monreal. Answered questions related to these results to the best of my ability.     Shared with patient that Dr. Kimo Child has rev

## 2021-02-26 ENCOUNTER — VIRTUAL PHONE E/M (OUTPATIENT)
Dept: SURGERY | Facility: CLINIC | Age: 66
End: 2021-02-26
Payer: COMMERCIAL

## 2021-02-26 DIAGNOSIS — Z98.890 POST-OPERATIVE STATE: Primary | ICD-10-CM

## 2021-02-26 PROCEDURE — 99024 POSTOP FOLLOW-UP VISIT: CPT | Performed by: SURGERY

## 2021-02-26 NOTE — PROGRESS NOTES
S:  Fiona Covarrubias is a 60year old female sp   [place] node intraoperative injection, localization and excision for 2 deep Right axillary nodes, Right partial mastectomy following needle localization with mammographic control, intraoperative use and interpr

## 2021-03-01 ENCOUNTER — TELEPHONE (OUTPATIENT)
Dept: HEMATOLOGY/ONCOLOGY | Facility: HOSPITAL | Age: 66
End: 2021-03-01

## 2021-03-01 NOTE — TELEPHONE ENCOUNTER
Phoned patient and shared that Oncotype DX results are now available. Re-scheduled patient with Dr. Chandu Adhikari for tomorrow 3/2/21 at 4pm.  Patient agreeable.

## 2021-03-02 ENCOUNTER — OFFICE VISIT (OUTPATIENT)
Dept: HEMATOLOGY/ONCOLOGY | Facility: HOSPITAL | Age: 66
End: 2021-03-02
Payer: COMMERCIAL

## 2021-03-02 VITALS
DIASTOLIC BLOOD PRESSURE: 66 MMHG | HEART RATE: 84 BPM | RESPIRATION RATE: 16 BRPM | HEIGHT: 65 IN | WEIGHT: 162 LBS | TEMPERATURE: 98 F | BODY MASS INDEX: 26.99 KG/M2 | SYSTOLIC BLOOD PRESSURE: 113 MMHG | OXYGEN SATURATION: 97 %

## 2021-03-02 DIAGNOSIS — C50.211 MALIGNANT NEOPLASM OF UPPER-INNER QUADRANT OF RIGHT BREAST IN FEMALE, ESTROGEN RECEPTOR POSITIVE (HCC): Primary | ICD-10-CM

## 2021-03-02 DIAGNOSIS — Z17.0 MALIGNANT NEOPLASM OF UPPER-INNER QUADRANT OF RIGHT BREAST IN FEMALE, ESTROGEN RECEPTOR POSITIVE (HCC): Primary | ICD-10-CM

## 2021-03-02 DIAGNOSIS — Z78.0 POST-MENOPAUSAL: ICD-10-CM

## 2021-03-02 PROCEDURE — 99214 OFFICE O/P EST MOD 30 MIN: CPT | Performed by: INTERNAL MEDICINE

## 2021-03-02 NOTE — PROGRESS NOTES
KRISTEN     Brenda Butcher is a 60year old female here for follow up of diagnosis of Malignant neoplasm of upper-inner quadrant of right breast in female, estrogen receptor positive (hcc)  (primary encounter diagnosis)     Patient is status post right breast l LOCALIZATION Right 2/16/2021    Performed by Bereket Sykes MD at [facility] OR   • BREAST LUMPECTOMY Right 2/16/2021    Performed by Bereket Sykes MD at [facility] OR   • BREAST SENTINEL LYMPH NODE BIOPSY Right 2/16/2021    Performed by Odilon Lepe - Signed by Zeenat Granados MD on 3/2/2021      Flory Castellon is a 60year old female with ER/MS positive breast cancer, node negative. Oncotype score low risk or recurrence.   With the patient that with an Oncotype recurrence risk score of 12 her chance o 02/16/2021 11:49 AM           Ordering Location:     [facility]          Received:            02/16/2021 11:59 AM                                  Operating Room                                                               Pathologist: immunohistochemical positive controls are examined and showed appropriate reactivity. Validation, Performance, reporting,  and proficiency testing regarding the assay is in compliance with the publishes ASCO-CAP Guideline (2020).   [place] Mitotic Rate:    Score 1    Overall Grade:    Grade 1 (scores of 3, 4 or 5)    Tumor Size:    Greatest dimension of largest invasive focus (Millimeters): 17 mm    Tumor Focality:    Single focus of invasive carcinoma    Ductal Carcinoma In Situ (DCIS): TPA:  · Single lymph node, negative for carcinoma. ·    Allen lymph node #2 FSB & TPB:  · Single lymph node, negative for carcinoma.

## 2021-03-02 NOTE — PATIENT INSTRUCTIONS
Text SUPPORT1 to 44176 to learn if you may be eligible for financial support with your medication(s). Msg & Data Rates May Apply. Msg freq varies. Terms apply. Text HELP for help. Text STOP to end.    Letrozole tablets  Brand Name: Becki Spaulding  What is this If you miss a dose, take it as soon as you can. If it is almost time for your next dose, take only that dose. Do not take double or extra doses. Where should I keep my medicine? Keep out of the reach of children.   Store between 15 and 30 degrees C (59 an NOTE:This sheet is a summary. It may not cover all possible information. If you have questions about this medicine, talk to your doctor, pharmacist, or health care provider.  Copyright© 2020 [employer]

## 2021-03-05 ENCOUNTER — OFFICE VISIT (OUTPATIENT)
Dept: RADIATION ONCOLOGY | Facility: HOSPITAL | Age: 66
End: 2021-03-05
Attending: RADIOLOGY
Payer: COMMERCIAL

## 2021-03-05 VITALS
BODY MASS INDEX: 26.99 KG/M2 | DIASTOLIC BLOOD PRESSURE: 64 MMHG | HEIGHT: 65 IN | SYSTOLIC BLOOD PRESSURE: 123 MMHG | WEIGHT: 162 LBS | RESPIRATION RATE: 18 BRPM | TEMPERATURE: 98 F | HEART RATE: 83 BPM

## 2021-03-05 PROCEDURE — 99212 OFFICE O/P EST SF 10 MIN: CPT

## 2021-03-05 NOTE — PROGRESS NOTES
Nursing Consultation Note  Patient: Halley Whiting  YOB: 1955  Age: 60year old  Radiation Oncologist: Dr. Dani Walters  Referring Physician: [email or web address]  Consult Date: 3/5/2021      Chemotherapy: NO  Labs: Reviewed  Imaging: Pharmacy:    [facility] [address], [address] [place] [address], [phone], [phone]  [address] [place] [address]  Phone: [phone] Fax: [phone]    [facility]  [address] [address] Frequency: Monthly or less        Comment: socially      Drug use: No      Sexual activity: Not on file    Lifestyle      Physical activity        Days per week: Not on file        Minutes per session: Not on file      Stress: Not on file    Relationships radiation, rather than wait. Primary language:  English  Language line required?  no  Comprehension Ability:  excellent  Able to read?  yes  Able to write? yes  Communication tools:  na  Patient's ability to learn:  excellent  Readiness to learn:   Mot therapy Instruct patient on additional skin care measures during radiation therapy Keep area clean and dry Encourage fluids/diet    Expected Outcomes:  knowledge of care plan    Progress Toward Outcome:  Making progress    Pamphlets/Handouts Given to [facility]

## 2021-03-05 NOTE — CONSULTS
RADIATION ONCOLOGY NOTE    DATE OF VISIT: 3/5/2021    DIAGNOSIS :  Stage  IA T1c N0sn M0, right breast cancer status post lumpectomy and sentinel lymph node biopsy recovering well, receptor strongly positive for adjuvant hormonal blockade and for adjuvant 2/16/2021  PROCEDURE: LISA POST PROCEDURE IMAGE RIGHT (CPT=77065)  COMPARISON: External Exams, LISA POST PROCEDURAL IMAGE RIGHT (CPT=77065), 12/31/2020, 10:46 AM.  INDICATIONS: Malignant neoplasm of right female breast, unspecified estrogen receptor status, Oral Tab Take 500 mg by mouth every 6 (six) hours as needed for Pain. • NON FORMULARY Take 3 mg by mouth nightly. Take Bromazepam (lexilium) PRN anxiety.    Prescribed from Parminder MD in [place]     • HYDROcodone-acetaminophen 5-325 MG Oral Tab Take 1 ta weight 73.5 kg (162 lb). PERFORMANCE STATUS  0,  Denies PAIN  GENERAL:  Pt is alert and oriented times three in no acute distress. HEENT:  PERRLA, EOMI,   NECK:  Supple with no cervical, supraclavicular or axillary lymphadenopathy.    CHEST: S post righ 1.5 Liz scanner. Initial axial T1 weight GRE imaging both before and after   IV administration of 15 ml of Dotarem. Subsequently, subtraction imaging and 3D reconstruction were completed on an independent workstation.       ENHANCEMENT PATTERN:   Mild, wi OF BREAST CANCER. A CLINICALLY SUSPICIOUS PALPABLE LUMP SHOULD BE BIOPSIED.                       ==    Collected:  2/16/2021 11:49   Status:  Edited Result - FINAL   Visible to patient:  Yes (Anthony)   Dx: Malignant neoplasm of right female br. ..   Comp and incomplete membrane staining in more than 10% of tumor cells  2+ (Equivocal): Weak to moderate complete membranous staining in more than 10% of tumor cells.   3+ (Positive): Strong complete staining in more than 10% of tumor cells     The tissue that ha nodes, negative for carcinoma (0/2). Comment:  Repeat HER-2/katiana is pending and results will be issued in a supplemental report.    Electronically signed by Carlos Olmedo MD on 2/18/2021 at [place] [address] Performed on Previous Biopsy     Progesterone Receptor (PgR) Status  Positive    Percentage of Cells with Nuclear Positivity  90 %   Breast Biomarker Testing Performed on Previous Biopsy     HER2 (by immunohistochemistry)  Negative (Score 0)    Breast Biom carcinoma. \" The results were conveyed to Dr. Mike Aguirre by Dr. Deloris Echols at 12:20 p.m. Specimen C is received in formalin labeled \"Shahbaz, right breast partial mastectomy\" received in formalin.  The specimen consists of a 59 gram yellow-tan lumpectomy, recei 3: 50 p.m., and placed in fresh formalin at this time. The specimen is removed from formalin for further processing at 8:30 p.m.       Specimen D is received in formalin labeled \"Shahbaz, axillary tissue adjacent to sentinel node\" and consists of multiple pi

## 2021-03-08 ENCOUNTER — TELEPHONE (OUTPATIENT)
Dept: SURGERY | Facility: CLINIC | Age: 66
End: 2021-03-08

## 2021-03-08 NOTE — TELEPHONE ENCOUNTER
Per pt asking for the name and phone number of the oncologist recommended by Dr. Jeannine Chiang (not Dr. Nadia Zarate).  Please advise

## 2021-03-08 NOTE — TELEPHONE ENCOUNTER
Called patient back and left message that she has been seeing Dr. Bryant Viveros? I'm not sure who she is enquiring about? Poss Dr. Aruna Odell, tel [phone].

## 2021-03-10 ENCOUNTER — APPOINTMENT (OUTPATIENT)
Dept: RADIATION ONCOLOGY | Facility: HOSPITAL | Age: 66
End: 2021-03-10
Attending: RADIOLOGY
Payer: COMMERCIAL

## 2021-03-10 PROCEDURE — 77290 THER RAD SIMULAJ FIELD CPLX: CPT | Performed by: RADIOLOGY

## 2021-03-10 PROCEDURE — 77333 RADIATION TREATMENT AID(S): CPT | Performed by: RADIOLOGY

## 2021-03-11 ENCOUNTER — OFFICE VISIT (OUTPATIENT)
Dept: FAMILY MEDICINE CLINIC | Facility: CLINIC | Age: 66
End: 2021-03-11
Payer: COMMERCIAL

## 2021-03-11 ENCOUNTER — APPOINTMENT (OUTPATIENT)
Dept: RADIATION ONCOLOGY | Facility: HOSPITAL | Age: 66
End: 2021-03-11
Attending: RADIOLOGY
Payer: COMMERCIAL

## 2021-03-11 VITALS
WEIGHT: 163.38 LBS | OXYGEN SATURATION: 96 % | DIASTOLIC BLOOD PRESSURE: 66 MMHG | BODY MASS INDEX: 27.22 KG/M2 | HEART RATE: 100 BPM | SYSTOLIC BLOOD PRESSURE: 114 MMHG | HEIGHT: 65 IN

## 2021-03-11 DIAGNOSIS — G89.29 CHRONIC BILATERAL LOW BACK PAIN WITHOUT SCIATICA: ICD-10-CM

## 2021-03-11 DIAGNOSIS — Z12.11 ENCOUNTER FOR SCREENING FOR MALIGNANT NEOPLASM OF COLON: ICD-10-CM

## 2021-03-11 DIAGNOSIS — M79.672 LEFT FOOT PAIN: Primary | ICD-10-CM

## 2021-03-11 DIAGNOSIS — A60.00 HERPES SIMPLEX INFECTION OF GENITOURINARY SYSTEM: ICD-10-CM

## 2021-03-11 DIAGNOSIS — G57.92 NEUROPATHY OF LEFT FOOT: ICD-10-CM

## 2021-03-11 DIAGNOSIS — M54.50 CHRONIC BILATERAL LOW BACK PAIN WITHOUT SCIATICA: ICD-10-CM

## 2021-03-11 PROCEDURE — 77300 RADIATION THERAPY DOSE PLAN: CPT | Performed by: RADIOLOGY

## 2021-03-11 PROCEDURE — 77295 3-D RADIOTHERAPY PLAN: CPT | Performed by: RADIOLOGY

## 2021-03-11 PROCEDURE — 77334 RADIATION TREATMENT AID(S): CPT | Performed by: RADIOLOGY

## 2021-03-11 PROCEDURE — 99203 OFFICE O/P NEW LOW 30 MIN: CPT | Performed by: NURSE PRACTITIONER

## 2021-03-11 PROCEDURE — 3008F BODY MASS INDEX DOCD: CPT | Performed by: NURSE PRACTITIONER

## 2021-03-11 PROCEDURE — 3078F DIAST BP <80 MM HG: CPT | Performed by: NURSE PRACTITIONER

## 2021-03-11 PROCEDURE — 3074F SYST BP LT 130 MM HG: CPT | Performed by: NURSE PRACTITIONER

## 2021-03-11 RX ORDER — VALACYCLOVIR HYDROCHLORIDE 500 MG/1
500 TABLET, FILM COATED ORAL DAILY
Qty: 90 TABLET | Refills: 1 | Status: SHIPPED | OUTPATIENT
Start: 2021-03-11 | End: 2021-04-01

## 2021-03-11 NOTE — PROGRESS NOTES
Chief Complaint:   Patient presents with:  New Patient  Post-Op: breast cancer surgery   Pain: L outer three toe nerve pain       HPI:   This is a 60year old female coming in to establish care and for pain in left foot.  Patient recently diagnosed with [facility]                                                               Pathologist:           Carlos Olmedo MD                                                        Specimens:   A) - Britt Lymph node, 1.  SENTINEL LYMPH NODE #1 FROZEN testing regarding the assay is in compliance with the publishes ASCO-CAP Guideline (2020). Final Diagnosis:         A. Right sentinel lymph node #1:  · Single lymph node, negative for carcinoma (0/1).   · Immunohistochemical stain for cytokeratin AE1 p focus (Millimeters): 17 mm    Tumor Focality:    Single focus of invasive carcinoma     Ductal Carcinoma In Situ (DCIS):    Not identified     Lobular Carcinoma In Situ (LCIS):    Not identified     Tumor Extent:        Lymphovascular Invasion:    Not iden negative for carcinoma. Clinical Information       C50.911 Malignant Neoplasm Of Right Female Breast, Unspecified Estrogen Receptor Status, Unspecified Site Of Breast (Hcc).          Gross Description       Specimen A is received fresh for frozen sec orange, inferior = green, medial = red, lateral = yellow, posterior = black. The specimen is then serially sectioned into 12 slices from medial to lateral to reveal an ill-defined firm mass, predominantly in slices 7-9 (1.7 x 1.5 x 0.9 cm).  The mass is 0.5 Interpretation Malignant (A)               Past Medical History:   Diagnosis Date   • Anxiety state    • Back problem     stenosis   • Breast cancer ([facility] 02/16/2021   • Cancer ([facility]    • Hemorrhoids     external   • Muscle weakness     right leg r/t back (lexilium) PRN anxiety. Prescribed from Parminder MD in [facility]        Counseling given: Not Answered  Comment: very rare-socially       REVIEW OF SYSTEMS:   CONSTITUTIONAL:  Denies unusual weight gain/loss, fever, chills, or fatigue.   INTEGUMENTARY:  Denie and tone, sensory intact. ASSESSMENT AND PLAN:   1. Left foot pain  -Will refer to podiatry for workup and possible insoles for L foot.  Unclear if this is related to back pain, she is not currently experiencing pain/sciatica so I would be inclined to th

## 2021-03-16 ENCOUNTER — TELEPHONE (OUTPATIENT)
Dept: PHYSICAL THERAPY | Facility: HOSPITAL | Age: 66
End: 2021-03-16

## 2021-03-16 ENCOUNTER — TELEPHONE (OUTPATIENT)
Dept: HEMATOLOGY/ONCOLOGY | Facility: HOSPITAL | Age: 66
End: 2021-03-16

## 2021-03-16 ENCOUNTER — OFFICE VISIT (OUTPATIENT)
Dept: PHYSICAL THERAPY | Facility: HOSPITAL | Age: 66
End: 2021-03-16
Attending: SURGERY
Payer: COMMERCIAL

## 2021-03-16 ENCOUNTER — APPOINTMENT (OUTPATIENT)
Dept: PHYSICAL THERAPY | Age: 66
End: 2021-03-16
Attending: NURSE PRACTITIONER
Payer: COMMERCIAL

## 2021-03-16 NOTE — TELEPHONE ENCOUNTER
Returned phone call and discussed also trying  registering through [facility] (SAMUEL CONTRERAS). Pt verbalizes understanding.

## 2021-03-16 NOTE — PROGRESS NOTES
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

## 2021-03-18 ENCOUNTER — OFFICE VISIT (OUTPATIENT)
Dept: PHYSICAL THERAPY | Age: 66
End: 2021-03-18
Attending: NURSE PRACTITIONER
Payer: COMMERCIAL

## 2021-03-18 ENCOUNTER — APPOINTMENT (OUTPATIENT)
Dept: RADIATION ONCOLOGY | Facility: HOSPITAL | Age: 66
End: 2021-03-18
Attending: RADIOLOGY
Payer: COMMERCIAL

## 2021-03-18 DIAGNOSIS — M79.672 LEFT FOOT PAIN: ICD-10-CM

## 2021-03-18 DIAGNOSIS — G89.29 CHRONIC BILATERAL LOW BACK PAIN WITHOUT SCIATICA: ICD-10-CM

## 2021-03-18 DIAGNOSIS — M54.50 CHRONIC BILATERAL LOW BACK PAIN WITHOUT SCIATICA: ICD-10-CM

## 2021-03-18 PROCEDURE — 97110 THERAPEUTIC EXERCISES: CPT

## 2021-03-18 PROCEDURE — 77412 RADIATION TX DELIVERY LVL 3: CPT | Performed by: RADIOLOGY

## 2021-03-18 PROCEDURE — 97162 PT EVAL MOD COMPLEX 30 MIN: CPT

## 2021-03-18 PROCEDURE — 77280 THER RAD SIMULAJ FIELD SMPL: CPT | Performed by: RADIOLOGY

## 2021-03-18 NOTE — PROGRESS NOTES
LOWER EXTREMITY EVALUATION:   Referring Physician: KAELA Jones  Diagnosis: Left foot pain (Q03.447)  Chronic bilateral low back pain without sciatica (M54.5,G89.29)       PATIENT SUMMARY   Reina Berrios is a 60year old y/o female who presents t impairment. ASSESSMENT:   Emilia Melendez is a 60year old year old female who presents to physical therapy with pain in her L foot (metatarsal heads 2-4) with onset about 9 months ago.  Patients impairments include decreased ankle range of motion, decreased with FOTO score and clinical rationale, this evaluation involved Moderate Complexity decision making due to 3+ personal factors/comorbidities, 3 body structures involved/activity limitations, and evolving symptoms including changing pain levels.      PLAN O

## 2021-03-19 PROCEDURE — 77290 THER RAD SIMULAJ FIELD CPLX: CPT | Performed by: RADIOLOGY

## 2021-03-19 PROCEDURE — 77412 RADIATION TX DELIVERY LVL 3: CPT | Performed by: RADIOLOGY

## 2021-03-22 ENCOUNTER — OFFICE VISIT (OUTPATIENT)
Dept: RADIATION ONCOLOGY | Facility: HOSPITAL | Age: 66
End: 2021-03-22
Attending: RADIOLOGY
Payer: COMMERCIAL

## 2021-03-22 VITALS
HEART RATE: 90 BPM | DIASTOLIC BLOOD PRESSURE: 72 MMHG | SYSTOLIC BLOOD PRESSURE: 120 MMHG | RESPIRATION RATE: 18 BRPM | TEMPERATURE: 98 F

## 2021-03-22 DIAGNOSIS — C50.211 MALIGNANT NEOPLASM OF UPPER-INNER QUADRANT OF RIGHT BREAST IN FEMALE, ESTROGEN RECEPTOR POSITIVE (HCC): Primary | ICD-10-CM

## 2021-03-22 DIAGNOSIS — Z17.0 MALIGNANT NEOPLASM OF UPPER-INNER QUADRANT OF RIGHT BREAST IN FEMALE, ESTROGEN RECEPTOR POSITIVE (HCC): Primary | ICD-10-CM

## 2021-03-22 PROCEDURE — 77387 GUIDANCE FOR RADJ TX DLVR: CPT | Performed by: RADIOLOGY

## 2021-03-22 PROCEDURE — 77412 RADIATION TX DELIVERY LVL 3: CPT | Performed by: RADIOLOGY

## 2021-03-22 NOTE — PROGRESS NOTES
[facility] Radiation Treatment Management Note 1-5    Patient:  Nataliia Hernandez  Age:  60year old  Visit Diagnosis:    1.  Malignant neoplasm of upper-inner quadrant of right breast in female, estrogen receptor positive ([facility] [address] [facility]      Primar

## 2021-03-23 ENCOUNTER — HOSPITAL ENCOUNTER (OUTPATIENT)
Dept: BONE DENSITY | Age: 66
Discharge: HOME | End: 2021-03-23
Attending: INTERNAL MEDICINE
Payer: COMMERCIAL

## 2021-03-23 ENCOUNTER — TELEPHONE (OUTPATIENT)
Dept: PHYSICAL THERAPY | Facility: HOSPITAL | Age: 66
End: 2021-03-23

## 2021-03-23 ENCOUNTER — APPOINTMENT (OUTPATIENT)
Dept: PHYSICAL THERAPY | Age: 66
End: 2021-03-23
Attending: NURSE PRACTITIONER
Payer: COMMERCIAL

## 2021-03-23 DIAGNOSIS — Z78.0 POST-MENOPAUSAL: ICD-10-CM

## 2021-03-23 PROCEDURE — 77080 DXA BONE DENSITY AXIAL: CPT | Performed by: INTERNAL MEDICINE

## 2021-03-23 PROCEDURE — 77412 RADIATION TX DELIVERY LVL 3: CPT | Performed by: RADIOLOGY

## 2021-03-23 PROCEDURE — 77387 GUIDANCE FOR RADJ TX DLVR: CPT | Performed by: RADIOLOGY

## 2021-03-24 PROCEDURE — 77412 RADIATION TX DELIVERY LVL 3: CPT | Performed by: RADIOLOGY

## 2021-03-24 PROCEDURE — 77387 GUIDANCE FOR RADJ TX DLVR: CPT | Performed by: RADIOLOGY

## 2021-03-25 ENCOUNTER — APPOINTMENT (OUTPATIENT)
Dept: PHYSICAL THERAPY | Age: 66
End: 2021-03-25
Attending: NURSE PRACTITIONER
Payer: COMMERCIAL

## 2021-03-25 ENCOUNTER — TELEPHONE (OUTPATIENT)
Dept: HEMATOLOGY/ONCOLOGY | Facility: HOSPITAL | Age: 66
End: 2021-03-25

## 2021-03-25 ENCOUNTER — TELEPHONE (OUTPATIENT)
Dept: PHYSICAL THERAPY | Facility: HOSPITAL | Age: 66
End: 2021-03-25

## 2021-03-25 NOTE — TELEPHONE ENCOUNTER
Dr Ivory Jacobo & Dr Lior Huang - Carmelita Schirmer patient receiving radiation    Itchy Ears/Swollen lips/Numb Hard Pallet/Chest tightness: (On Monday afternoon Niraj Corona started having hives around her wrists, and waist. Her ears were itchy. During the night she started having dyspnea. She only had children's benadryl in the house. She took 3 tabs. They did nothing for her. Her son in law went out and got her adult benadryl. She took a dose and felt better. Niraj Corona continued to have redness on her face and neck. This morning she woke up and her ears are itching. Her lip is swollen. Her roof of her mouth feels numb and her chest is tight. She denies swelling of her tongue. No difficulty swallowing. She denies eating or drinking any new foods or fluids. No new soaps, body wash, beauty products, laundry detergent or fabric softener. No new cleaning products for the house.)    Niraj Corona asked if her allergic reaction could be a reaction to her radiation treatment for her breast cancer? I told her I don't believe so. I asked her to please present to the ER for evaluation. She agreed. I assured her I would update Dr Ivory Jacobo & Dr Lior Huang. Report called to the [address] [place] [address] ER Charge RN.

## 2021-03-25 NOTE — TELEPHONE ENCOUNTER
Patient called and is doing radiation every day at 2:15 p.m. She started radiation one week ago. Patient has hives around her wrists, ears,waist and she was having difficulty breathing the other night. She took 3 baby tablets of  benadryl and 1 adult tablet late Monday night, actually Tuesday morning at 2:00 a.m. which did help her, however, Patient awakened this morning and her ears were itching and her lip was swollen. She just took another adult benadryl tablet. Please call her. Thank you.  Christy

## 2021-03-26 ENCOUNTER — TELEPHONE (OUTPATIENT)
Dept: HEMATOLOGY/ONCOLOGY | Facility: HOSPITAL | Age: 66
End: 2021-03-26

## 2021-03-26 PROCEDURE — 77412 RADIATION TX DELIVERY LVL 3: CPT | Performed by: RADIOLOGY

## 2021-03-26 PROCEDURE — 77387 GUIDANCE FOR RADJ TX DLVR: CPT | Performed by: RADIOLOGY

## 2021-03-26 PROCEDURE — 77336 RADIATION PHYSICS CONSULT: CPT | Performed by: RADIOLOGY

## 2021-03-26 NOTE — TELEPHONE ENCOUNTER
Pt should take the famotidine and prednisone she was prescribed. She called with allergic reaction yesterday. Would not add other supplements at this time until she has resolved the allergic reaction.

## 2021-03-26 NOTE — TELEPHONE ENCOUNTER
Pt has been put on Famotidine & Predinisone on 3/25 to fight a reaction she had. Pt also started taking Adrenal health, rapid immune recovery & selenium supplements on her own & would like for you to call her to go over meds. and if they are ok with her leann

## 2021-03-26 NOTE — TELEPHONE ENCOUNTER
I called Mahendra Slater and updated her Terrace Raddle, TONNY wants her to take the famotidine and prednisone that were prescribed yesterday for her allergic reaction. She would not add other supplements at this time until she has resolved the allergic reaction.  Mahendra Slater verb

## 2021-03-29 ENCOUNTER — OFFICE VISIT (OUTPATIENT)
Dept: RADIATION ONCOLOGY | Facility: HOSPITAL | Age: 66
End: 2021-03-29
Attending: RADIOLOGY
Payer: COMMERCIAL

## 2021-03-29 VITALS
HEART RATE: 99 BPM | TEMPERATURE: 98 F | DIASTOLIC BLOOD PRESSURE: 65 MMHG | SYSTOLIC BLOOD PRESSURE: 129 MMHG | RESPIRATION RATE: 18 BRPM

## 2021-03-29 DIAGNOSIS — C50.211 MALIGNANT NEOPLASM OF UPPER-INNER QUADRANT OF RIGHT BREAST IN FEMALE, ESTROGEN RECEPTOR POSITIVE (HCC): Primary | ICD-10-CM

## 2021-03-29 DIAGNOSIS — Z17.0 MALIGNANT NEOPLASM OF UPPER-INNER QUADRANT OF RIGHT BREAST IN FEMALE, ESTROGEN RECEPTOR POSITIVE (HCC): Primary | ICD-10-CM

## 2021-03-29 PROCEDURE — 77412 RADIATION TX DELIVERY LVL 3: CPT | Performed by: RADIOLOGY

## 2021-03-29 PROCEDURE — 77387 GUIDANCE FOR RADJ TX DLVR: CPT | Performed by: RADIOLOGY

## 2021-03-29 NOTE — PROGRESS NOTES
[facility] Radiation Treatment Management Note 6-10    Patient:  Roshni Waters  Age:  60year old  Visit Diagnosis:  R breast IDC, pT1c N0 M0, stage IA, grade 1, ER/MO+  Primary Rad/Onc:  Dr. Emily Fajardo     Site Delivered Dose (cGy) Prescr

## 2021-03-30 ENCOUNTER — OFFICE VISIT (OUTPATIENT)
Dept: PODIATRY CLINIC | Facility: CLINIC | Age: 66
End: 2021-03-30
Payer: COMMERCIAL

## 2021-03-30 ENCOUNTER — APPOINTMENT (OUTPATIENT)
Dept: PHYSICAL THERAPY | Age: 66
End: 2021-03-30
Attending: NURSE PRACTITIONER
Payer: COMMERCIAL

## 2021-03-30 VITALS — RESPIRATION RATE: 18 BRPM | SYSTOLIC BLOOD PRESSURE: 132 MMHG | HEART RATE: 82 BPM | DIASTOLIC BLOOD PRESSURE: 77 MMHG

## 2021-03-30 DIAGNOSIS — M79.672 LEFT FOOT PAIN: ICD-10-CM

## 2021-03-30 DIAGNOSIS — G57.82 NEUROMA OF THIRD INTERSPACE OF LEFT FOOT: Primary | ICD-10-CM

## 2021-03-30 PROCEDURE — 3078F DIAST BP <80 MM HG: CPT | Performed by: PODIATRIST

## 2021-03-30 PROCEDURE — 99203 OFFICE O/P NEW LOW 30 MIN: CPT | Performed by: PODIATRIST

## 2021-03-30 PROCEDURE — 3075F SYST BP GE 130 - 139MM HG: CPT | Performed by: PODIATRIST

## 2021-03-30 PROCEDURE — 77412 RADIATION TX DELIVERY LVL 3: CPT | Performed by: RADIOLOGY

## 2021-03-30 PROCEDURE — 64455 NJX AA&/STRD PLTR COM DG NRV: CPT | Performed by: PODIATRIST

## 2021-03-30 PROCEDURE — 77387 GUIDANCE FOR RADJ TX DLVR: CPT | Performed by: RADIOLOGY

## 2021-03-30 RX ORDER — TRIAMCINOLONE ACETONIDE 40 MG/ML
20 INJECTION, SUSPENSION INTRA-ARTICULAR; INTRAMUSCULAR ONCE
Status: COMPLETED | OUTPATIENT
Start: 2021-03-30 | End: 2021-03-30

## 2021-03-30 RX ADMIN — TRIAMCINOLONE ACETONIDE 20 MG: 40 INJECTION, SUSPENSION INTRA-ARTICULAR; INTRAMUSCULAR at 15:39:00

## 2021-03-30 NOTE — PROGRESS NOTES
Lacie Phalen is a 60year old female. Patient presents with:   Foot Pain: Left - onset [place] - she woud have pain in the last 3 toes and after resting she was ok - she was better but for the past month she has pain in any pt states snores loud and wakes often   • Visual impairment       Past Surgical History:   Procedure Laterality Date   • APPENDECTOMY  1999   • BREAST BIOPSY NEEDLE LOCALIZATION Right 2/16/2021    Performed by Sulma Hernandez MD at [facility] OR   • [facility] of Clubs or Organizations:       Attends Club or Organization Meetings:       Marital Status:   Intimate Partner Violence:       Fear of Current or Ex-Partner:       Emotionally Abused:       Physically Abused:       Sexually Abused:         REVIEW OF SYST 2 to 3 weeks. The patient indicates understanding of these issues and agrees to the plan.     Thony Condon DPM

## 2021-03-30 NOTE — PROGRESS NOTES
Per Dr Newt Fleischer, draw up 0.5 mL of 0.5% Marcaine and 0.5 mL of Kenalog 40 for injection to left foot. Yumiko Moore    Patient provided education handout for cortisone injection. Patient left office prior to obtaining post injection vitals.

## 2021-03-31 ENCOUNTER — TELEPHONE (OUTPATIENT)
Dept: FAMILY MEDICINE CLINIC | Facility: CLINIC | Age: 66
End: 2021-03-31

## 2021-03-31 DIAGNOSIS — A60.00 HERPES SIMPLEX INFECTION OF GENITOURINARY SYSTEM: ICD-10-CM

## 2021-03-31 PROCEDURE — 77412 RADIATION TX DELIVERY LVL 3: CPT | Performed by: RADIOLOGY

## 2021-03-31 PROCEDURE — 77387 GUIDANCE FOR RADJ TX DLVR: CPT | Performed by: RADIOLOGY

## 2021-04-01 ENCOUNTER — OFFICE VISIT (OUTPATIENT)
Dept: PHYSICAL THERAPY | Age: 66
End: 2021-04-01
Attending: NURSE PRACTITIONER
Payer: COMMERCIAL

## 2021-04-01 ENCOUNTER — NURSE ONLY (OUTPATIENT)
Dept: RADIATION ONCOLOGY | Facility: HOSPITAL | Age: 66
End: 2021-04-01
Attending: RADIOLOGY
Payer: COMMERCIAL

## 2021-04-01 DIAGNOSIS — M79.672 LEFT FOOT PAIN: ICD-10-CM

## 2021-04-01 DIAGNOSIS — M54.50 CHRONIC BILATERAL LOW BACK PAIN WITHOUT SCIATICA: ICD-10-CM

## 2021-04-01 DIAGNOSIS — G89.29 CHRONIC BILATERAL LOW BACK PAIN WITHOUT SCIATICA: ICD-10-CM

## 2021-04-01 PROCEDURE — 97530 THERAPEUTIC ACTIVITIES: CPT

## 2021-04-01 PROCEDURE — 77412 RADIATION TX DELIVERY LVL 3: CPT | Performed by: RADIOLOGY

## 2021-04-01 PROCEDURE — 77387 GUIDANCE FOR RADJ TX DLVR: CPT | Performed by: RADIOLOGY

## 2021-04-01 PROCEDURE — 97140 MANUAL THERAPY 1/> REGIONS: CPT

## 2021-04-01 RX ORDER — VALACYCLOVIR HYDROCHLORIDE 500 MG/1
1000 TABLET, FILM COATED ORAL DAILY
Qty: 60 TABLET | Refills: 0 | Status: SHIPPED | OUTPATIENT
Start: 2021-04-01

## 2021-04-01 RX ORDER — METHYLPREDNISOLONE 4 MG/1
TABLET ORAL
COMMUNITY
Start: 2021-03-25 | End: 2021-04-05 | Stop reason: ALTCHOICE

## 2021-04-01 RX ORDER — FAMOTIDINE 20 MG/1
20 TABLET ORAL 2 TIMES DAILY
COMMUNITY
Start: 2021-03-25 | End: 2021-04-05 | Stop reason: ALTCHOICE

## 2021-04-01 RX ORDER — EPINEPHRINE 0.3 MG/.3ML
0.3 INJECTION SUBCUTANEOUS
COMMUNITY
Start: 2021-03-25

## 2021-04-01 NOTE — PROGRESS NOTES
Diagnosis: Left foot pain (M79.672)  Chronic bilateral low back pain without sciatica (M54.5,G89.29)     Insurance (Authorized # of Visits):   HMO 8 visits           Authorizing Physician: KAELA Bear Next MD visit: none scheduled  Fall Risk: st strengthening as tolerated.      Charges: 1 Man, 1 TherAct       Total Timed Treatment: 45 min  Total Treatment Time: 45 min

## 2021-04-01 NOTE — TELEPHONE ENCOUNTER
A refill request was received for:  Requested Prescriptions     Pending Prescriptions Disp Refills   • valACYclovir HCl 500 MG Oral Tab 90 tablet 1     Sig: Take 1 tablet (500 mg total) by mouth daily.      Last refill date: 3/11/21  Qty:90  Last office vis

## 2021-04-02 ENCOUNTER — APPOINTMENT (OUTPATIENT)
Dept: RADIATION ONCOLOGY | Facility: HOSPITAL | Age: 66
End: 2021-04-02
Attending: RADIOLOGY
Payer: COMMERCIAL

## 2021-04-02 PROCEDURE — 77336 RADIATION PHYSICS CONSULT: CPT | Performed by: RADIOLOGY

## 2021-04-02 PROCEDURE — 77387 GUIDANCE FOR RADJ TX DLVR: CPT | Performed by: RADIOLOGY

## 2021-04-02 PROCEDURE — 77412 RADIATION TX DELIVERY LVL 3: CPT | Performed by: RADIOLOGY

## 2021-04-05 ENCOUNTER — OFFICE VISIT (OUTPATIENT)
Dept: RADIATION ONCOLOGY | Facility: HOSPITAL | Age: 66
End: 2021-04-05
Attending: RADIOLOGY
Payer: COMMERCIAL

## 2021-04-05 VITALS
HEART RATE: 98 BPM | DIASTOLIC BLOOD PRESSURE: 52 MMHG | RESPIRATION RATE: 18 BRPM | TEMPERATURE: 98 F | OXYGEN SATURATION: 98 % | SYSTOLIC BLOOD PRESSURE: 138 MMHG

## 2021-04-05 PROCEDURE — 77334 RADIATION TREATMENT AID(S): CPT | Performed by: RADIOLOGY

## 2021-04-05 PROCEDURE — 77412 RADIATION TX DELIVERY LVL 3: CPT | Performed by: RADIOLOGY

## 2021-04-05 PROCEDURE — 77290 THER RAD SIMULAJ FIELD CPLX: CPT | Performed by: RADIOLOGY

## 2021-04-05 NOTE — PROGRESS NOTES
[facility] Radiation Treatment Management Note 11-15    Patient:  Gatito Chowdhury  Age:  60year old  Visit Diagnosis:  R breast IDC, pT1c N0 M0, stage IA, grade 1, ER/AL+  Primary Rad/Onc:  Dr. Nguyen Vann     Site Delivered Dose (cGy) [facility]

## 2021-04-06 ENCOUNTER — OFFICE VISIT (OUTPATIENT)
Dept: INTEGRATIVE MEDICINE | Facility: CLINIC | Age: 66
End: 2021-04-06

## 2021-04-06 ENCOUNTER — OFFICE VISIT (OUTPATIENT)
Dept: PHYSICAL THERAPY | Age: 66
End: 2021-04-06
Attending: NURSE PRACTITIONER
Payer: COMMERCIAL

## 2021-04-06 DIAGNOSIS — M54.50 CHRONIC BILATERAL LOW BACK PAIN WITHOUT SCIATICA: ICD-10-CM

## 2021-04-06 DIAGNOSIS — M79.672 LEFT FOOT PAIN: ICD-10-CM

## 2021-04-06 DIAGNOSIS — G89.29 CHRONIC BILATERAL LOW BACK PAIN WITHOUT SCIATICA: ICD-10-CM

## 2021-04-06 PROCEDURE — 97124 MASSAGE THERAPY: CPT

## 2021-04-06 PROCEDURE — 77412 RADIATION TX DELIVERY LVL 3: CPT | Performed by: RADIOLOGY

## 2021-04-06 PROCEDURE — 77334 RADIATION TREATMENT AID(S): CPT | Performed by: RADIOLOGY

## 2021-04-06 PROCEDURE — 97110 THERAPEUTIC EXERCISES: CPT

## 2021-04-06 PROCEDURE — 77300 RADIATION THERAPY DOSE PLAN: CPT | Performed by: RADIOLOGY

## 2021-04-06 PROCEDURE — 77295 3-D RADIOTHERAPY PLAN: CPT | Performed by: RADIOLOGY

## 2021-04-06 NOTE — PROGRESS NOTES
Dimas Mullen has come in today to deal with the tension in her upper back, neck and shoulders area.   She is undergoing radiation currently for breast cancer and has been unable to be with her  through all of it due to covid and travel restrictions, he is l

## 2021-04-06 NOTE — PROGRESS NOTES
Diagnosis: Left foot pain (M79.672)  Chronic bilateral low back pain without sciatica (M54.5,G89.29)     Insurance (Authorized # of Visits):   O 8 visits           Authorizing Physician: KAELA Franco Next MD visit: none scheduled  Fall Risk: st mobility and progress strengthening as tolerated.      Charges: 1 Man, 1 TherAct       Total Timed Treatment: 45 min  Total Treatment Time: 45 min

## 2021-04-06 NOTE — PROGRESS NOTES
Dean Jung has come in today to deal with the tension in her upper back, neck and shoulders area.   She is undergoing radiation currently for breast cancer and has been unable to be with her  through all of it due to covid and travel restrictions, he is l

## 2021-04-07 PROCEDURE — 77387 GUIDANCE FOR RADJ TX DLVR: CPT | Performed by: RADIOLOGY

## 2021-04-07 PROCEDURE — 77412 RADIATION TX DELIVERY LVL 3: CPT | Performed by: RADIOLOGY

## 2021-04-08 ENCOUNTER — OFFICE VISIT (OUTPATIENT)
Dept: PHYSICAL THERAPY | Age: 66
End: 2021-04-08
Attending: NURSE PRACTITIONER
Payer: COMMERCIAL

## 2021-04-08 DIAGNOSIS — M54.50 CHRONIC BILATERAL LOW BACK PAIN WITHOUT SCIATICA: ICD-10-CM

## 2021-04-08 DIAGNOSIS — G89.29 CHRONIC BILATERAL LOW BACK PAIN WITHOUT SCIATICA: ICD-10-CM

## 2021-04-08 DIAGNOSIS — M79.672 LEFT FOOT PAIN: ICD-10-CM

## 2021-04-08 PROCEDURE — 77412 RADIATION TX DELIVERY LVL 3: CPT | Performed by: RADIOLOGY

## 2021-04-08 PROCEDURE — 97110 THERAPEUTIC EXERCISES: CPT

## 2021-04-08 PROCEDURE — 77387 GUIDANCE FOR RADJ TX DLVR: CPT | Performed by: RADIOLOGY

## 2021-04-08 NOTE — PROGRESS NOTES
Diagnosis: Left foot pain (M79.672)  Chronic bilateral low back pain without sciatica (M54.5,G89.29)     Insurance (Authorized # of Visits):   O 8 visits           Authorizing Physician: KAELA David Next MD visit: none scheduled  Fall Risk: st standing       Plan: Assess soft tissue mobility and progress strengthening as tolerated.      Charges: 2 TE     Total Timed Treatment: 30 min  Total Treatment Time: 30 min

## 2021-04-09 PROCEDURE — 77336 RADIATION PHYSICS CONSULT: CPT | Performed by: RADIOLOGY

## 2021-04-09 PROCEDURE — 77387 GUIDANCE FOR RADJ TX DLVR: CPT | Performed by: RADIOLOGY

## 2021-04-09 PROCEDURE — 77412 RADIATION TX DELIVERY LVL 3: CPT | Performed by: RADIOLOGY

## 2021-04-12 ENCOUNTER — APPOINTMENT (OUTPATIENT)
Dept: RADIATION ONCOLOGY | Facility: HOSPITAL | Age: 66
End: 2021-04-12
Attending: RADIOLOGY
Payer: COMMERCIAL

## 2021-04-12 VITALS
SYSTOLIC BLOOD PRESSURE: 127 MMHG | TEMPERATURE: 98 F | HEART RATE: 83 BPM | RESPIRATION RATE: 18 BRPM | DIASTOLIC BLOOD PRESSURE: 68 MMHG

## 2021-04-12 DIAGNOSIS — Z17.0 MALIGNANT NEOPLASM OF UPPER-INNER QUADRANT OF RIGHT BREAST IN FEMALE, ESTROGEN RECEPTOR POSITIVE (HCC): Primary | ICD-10-CM

## 2021-04-12 DIAGNOSIS — C50.211 MALIGNANT NEOPLASM OF UPPER-INNER QUADRANT OF RIGHT BREAST IN FEMALE, ESTROGEN RECEPTOR POSITIVE (HCC): Primary | ICD-10-CM

## 2021-04-12 PROCEDURE — 77280 THER RAD SIMULAJ FIELD SMPL: CPT | Performed by: RADIOLOGY

## 2021-04-12 PROCEDURE — 77412 RADIATION TX DELIVERY LVL 3: CPT | Performed by: RADIOLOGY

## 2021-04-12 RX ORDER — MOMETASONE FUROATE 1 MG/G
OINTMENT TOPICAL
Qty: 45 G | Refills: 2 | Status: SHIPPED | OUTPATIENT
Start: 2021-04-12 | End: 2021-05-25

## 2021-04-12 NOTE — PROGRESS NOTES
[facility] Radiation Treatment Management Note 16-20    Patient:  Halley Whiting  Age:  60year old  Visit Diagnosis:    1.  Malignant neoplasm of upper-inner quadrant of right breast in female, estrogen receptor positive ([facility] [address]      Prim

## 2021-04-12 NOTE — H&P
[facility], [facility] - Gastroenterology                                                                                                               Reason for consult: crc screening    Requesting physician or provider: Mikhail Mendez History:   Diagnosis Date   • Anxiety state    • Back problem     stenosis   • Breast cancer ([facility] [address] 02/16/2021   • Cancer [facility])    • Colon polyp 5-2017   • Hemorrhoids     external   • Muscle weakness     right leg r/t back issues   • Sleep apnea     never daily. 60 tablet 0   • anastrozole 1 MG Oral Tab tab Take 1 mg by mouth daily. • Calcium Carbonate (CALCIUM 500 OR) Take 1 tablet by mouth daily.      • mometasone 0.1 % External Ointment Apply to right breast TID for radiation dermatitis 45 g 2   • EPI pertinent labs and imaging were reviewed and discussed with patient today.         .  ASSESSMENT/PLAN:   Abraham Iniguez is a 60year old year-old female with history of breast ca, anxiety, back problem, cancer, hemorrhoids, sleep apnea    #constipation  #hemor Sulfate-Mg Sulf (SUPREP BOWEL PREP KIT) 17.5-3.13-1.6 GM/177ML Oral Solution 1 Bottle 0     Sig: Take as directed       Imaging & Referrals:  None    ENDOSCOPIC RISK BENEFIT DISCUSSION: I described the procedure in great detail with the patient.  I discusse

## 2021-04-12 NOTE — H&P (VIEW-ONLY)
[facility], [facility] - Gastroenterology                                                                                                               Reason for consult: crc screening    Requesting physician or provider: Hima Lakhani History:   Diagnosis Date   • Anxiety state    • Back problem     stenosis   • Breast cancer ([facility] [address] 02/16/2021   • Cancer [facility])    • Colon polyp 5-2017   • Hemorrhoids     external   • Muscle weakness     right leg r/t back issues   • Sleep apnea     never daily. 60 tablet 0   • anastrozole 1 MG Oral Tab tab Take 1 mg by mouth daily. • Calcium Carbonate (CALCIUM 500 OR) Take 1 tablet by mouth daily.      • mometasone 0.1 % External Ointment Apply to right breast TID for radiation dermatitis 45 g 2   • EPI pertinent labs and imaging were reviewed and discussed with patient today.         .  ASSESSMENT/PLAN:   Don Subramanian is a 60year old year-old female with history of breast ca, anxiety, back problem, cancer, hemorrhoids, sleep apnea    #constipation  #hemor Sulfate-Mg Sulf (SUPREP BOWEL PREP KIT) 17.5-3.13-1.6 GM/177ML Oral Solution 1 Bottle 0     Sig: Take as directed       Imaging & Referrals:  None    ENDOSCOPIC RISK BENEFIT DISCUSSION: I described the procedure in great detail with the patient.  I discusse

## 2021-04-12 NOTE — PATIENT INSTRUCTIONS
Follow up with Dr Suzanne Bardales 5/17 @ 2:00 pm.       Continue to moisturize for the next 2-3 weeks.     Please call with any questions or concerns to RN number [phone]    SPF 27 or greater when exposed to the sun, as your skin will remain sun sensitive for month

## 2021-04-13 ENCOUNTER — TELEPHONE (OUTPATIENT)
Dept: GASTROENTEROLOGY | Facility: CLINIC | Age: 66
End: 2021-04-13

## 2021-04-13 ENCOUNTER — APPOINTMENT (OUTPATIENT)
Dept: PHYSICAL THERAPY | Age: 66
End: 2021-04-13
Attending: NURSE PRACTITIONER
Payer: COMMERCIAL

## 2021-04-13 ENCOUNTER — TELEPHONE (OUTPATIENT)
Dept: PHYSICAL THERAPY | Facility: HOSPITAL | Age: 66
End: 2021-04-13

## 2021-04-13 ENCOUNTER — OFFICE VISIT (OUTPATIENT)
Dept: GASTROENTEROLOGY | Facility: CLINIC | Age: 66
End: 2021-04-13
Payer: COMMERCIAL

## 2021-04-13 VITALS
WEIGHT: 163.81 LBS | DIASTOLIC BLOOD PRESSURE: 58 MMHG | SYSTOLIC BLOOD PRESSURE: 100 MMHG | BODY MASS INDEX: 27.96 KG/M2 | HEIGHT: 64 IN | HEART RATE: 89 BPM

## 2021-04-13 DIAGNOSIS — K64.9 HEMORRHOIDS, UNSPECIFIED HEMORRHOID TYPE: ICD-10-CM

## 2021-04-13 DIAGNOSIS — K59.00 CONSTIPATION, UNSPECIFIED CONSTIPATION TYPE: Primary | ICD-10-CM

## 2021-04-13 DIAGNOSIS — K92.1 HEMATOCHEZIA: ICD-10-CM

## 2021-04-13 DIAGNOSIS — Z86.010 HISTORY OF COLON POLYPS: ICD-10-CM

## 2021-04-13 PROCEDURE — 3008F BODY MASS INDEX DOCD: CPT | Performed by: NURSE PRACTITIONER

## 2021-04-13 PROCEDURE — 99244 OFF/OP CNSLTJ NEW/EST MOD 40: CPT | Performed by: NURSE PRACTITIONER

## 2021-04-13 PROCEDURE — 77387 GUIDANCE FOR RADJ TX DLVR: CPT | Performed by: RADIOLOGY

## 2021-04-13 PROCEDURE — 3074F SYST BP LT 130 MM HG: CPT | Performed by: NURSE PRACTITIONER

## 2021-04-13 PROCEDURE — 77412 RADIATION TX DELIVERY LVL 3: CPT | Performed by: RADIOLOGY

## 2021-04-13 PROCEDURE — 3078F DIAST BP <80 MM HG: CPT | Performed by: NURSE PRACTITIONER

## 2021-04-13 RX ORDER — ANASTROZOLE 1 MG/1
1 TABLET ORAL DAILY
COMMUNITY
Start: 2021-04-12 | End: 2021-04-28

## 2021-04-13 RX ORDER — SODIUM, POTASSIUM,MAG SULFATES 17.5-3.13G
SOLUTION, RECONSTITUTED, ORAL ORAL
Qty: 1 BOTTLE | Refills: 0 | Status: ON HOLD | OUTPATIENT
Start: 2021-04-13 | End: 2021-05-05

## 2021-04-13 NOTE — PATIENT INSTRUCTIONS
-benefiber once daily  -samiratty potty  -labs  1. Schedule colonoscopy with [facility] w/ Dr. Darion Decker [Diagnosis: constipation, hemorrhoids, hematochezia, h/o cln polyps]    2.  bowel prep from pharmacy (split suprep)    3.  Continue all medications for proced

## 2021-04-13 NOTE — TELEPHONE ENCOUNTER
Scheduled for:  Colonoscopy [address]263  Provider Name:  Dr. Vilma Luna  Date:  5/5/21  Location:  [address] [place] [address]  Sedation:  [facility]  Time:  11:30 (pt is aware to arrive at 10:30am)  Prep:  Suprep  Meds/Allergies Reconciled?:  Jada/NP reviewed.    Diagnosis with codes:  Alida Porras

## 2021-04-14 ENCOUNTER — TELEPHONE (OUTPATIENT)
Dept: PHYSICAL THERAPY | Facility: HOSPITAL | Age: 66
End: 2021-04-14

## 2021-04-14 PROCEDURE — 77387 GUIDANCE FOR RADJ TX DLVR: CPT | Performed by: RADIOLOGY

## 2021-04-14 PROCEDURE — 77412 RADIATION TX DELIVERY LVL 3: CPT | Performed by: RADIOLOGY

## 2021-04-15 ENCOUNTER — OFFICE VISIT (OUTPATIENT)
Dept: PHYSICAL THERAPY | Age: 66
End: 2021-04-15
Attending: INTERNAL MEDICINE
Payer: COMMERCIAL

## 2021-04-15 ENCOUNTER — APPOINTMENT (OUTPATIENT)
Dept: PHYSICAL THERAPY | Age: 66
End: 2021-04-15
Attending: NURSE PRACTITIONER
Payer: COMMERCIAL

## 2021-04-15 PROCEDURE — 77387 GUIDANCE FOR RADJ TX DLVR: CPT | Performed by: RADIOLOGY

## 2021-04-15 PROCEDURE — 97140 MANUAL THERAPY 1/> REGIONS: CPT

## 2021-04-15 PROCEDURE — 77336 RADIATION PHYSICS CONSULT: CPT | Performed by: RADIOLOGY

## 2021-04-15 PROCEDURE — 77412 RADIATION TX DELIVERY LVL 3: CPT | Performed by: RADIOLOGY

## 2021-04-15 PROCEDURE — 97110 THERAPEUTIC EXERCISES: CPT

## 2021-04-15 NOTE — PROGRESS NOTES
RADIATION ONCOLOGY COMPLETION SUMMARY NOTE    DIAGNOSIS :  Stage  IA T1c N0sn M0, right breast cancer status post lumpectomy and sentinel lymph node biopsy recovering well, receptor strongly positive for adjuvant hormonal blockade and now s/p adjuvant radi

## 2021-04-15 NOTE — PROGRESS NOTES
Diagnosis: Left foot pain (M79.672)  Chronic bilateral low back pain without sciatica (M54.5,G89.29)     Insurance (Authorized # of Visits):   O 8 visits           Authorizing Physician: KAELA Hernandez Next MD visit: none scheduled  Fall Risk: st such as prolonged gait and stair negotiation   · Pt will report 0/10 pain with work and home activities such as prolonged walking and standing       Plan: Assess soft tissue mobility and progress strengthening as tolerated.      Charges: 1TE, 1 Man     Tota

## 2021-04-16 ENCOUNTER — OFFICE VISIT (OUTPATIENT)
Dept: INTEGRATIVE MEDICINE | Facility: CLINIC | Age: 66
End: 2021-04-16
Payer: COMMERCIAL

## 2021-04-16 VITALS
SYSTOLIC BLOOD PRESSURE: 112 MMHG | BODY MASS INDEX: 27.45 KG/M2 | HEART RATE: 98 BPM | DIASTOLIC BLOOD PRESSURE: 74 MMHG | WEIGHT: 160.81 LBS | HEIGHT: 64 IN | OXYGEN SATURATION: 97 %

## 2021-04-16 DIAGNOSIS — C50.211 MALIGNANT NEOPLASM OF UPPER-INNER QUADRANT OF RIGHT BREAST IN FEMALE, ESTROGEN RECEPTOR POSITIVE (HCC): Primary | ICD-10-CM

## 2021-04-16 DIAGNOSIS — E66.3 OVERWEIGHT (BMI 25.0-29.9): ICD-10-CM

## 2021-04-16 DIAGNOSIS — Z17.0 MALIGNANT NEOPLASM OF UPPER-INNER QUADRANT OF RIGHT BREAST IN FEMALE, ESTROGEN RECEPTOR POSITIVE (HCC): Primary | ICD-10-CM

## 2021-04-16 DIAGNOSIS — F41.9 ANXIETY: ICD-10-CM

## 2021-04-16 PROCEDURE — 3008F BODY MASS INDEX DOCD: CPT | Performed by: FAMILY MEDICINE

## 2021-04-16 PROCEDURE — 99204 OFFICE O/P NEW MOD 45 MIN: CPT | Performed by: FAMILY MEDICINE

## 2021-04-16 PROCEDURE — 3078F DIAST BP <80 MM HG: CPT | Performed by: FAMILY MEDICINE

## 2021-04-16 PROCEDURE — 3074F SYST BP LT 130 MM HG: CPT | Performed by: FAMILY MEDICINE

## 2021-04-16 NOTE — PATIENT INSTRUCTIONS
I have complete dayami in the body's ability to heal and transform. The products and items listed below (the “Products”)  and their claims have not been evaluated by the Food and Drug Administration.  Dietary products are not intended to treat, prevent, m stocked:    Vegetables: Choose a wide variety of colorful veggies on a regular basis, along with leafy greens like kale and collards. Basically, don’t stick to just one color all the time!     Fresh fruit: The same color rule applies here; however, also cho lettuce, sprouts, carrots, and avocado; vegetarian brown-rice sushi and a bowl of miso soup; or baked tempeh, mashed yams, and steamed greens topped with tahini    Snacks: Trail mix with almonds, raisins, pumpkin seeds, and cashews; an apple or pear with a hanging apparatus  •Lubricant, such as any vegetable oil  •Soap for cleaning enema bag after use    Prepare the space you will receive the enema. • Space can be a flat area, such as on the floor, near a toilet.  You may wish to place towels or sheets on th bag/bucket. • Hang the enema bag/bucket 18 inches above body height. If your enema bag has a speed  control valve, holding it at a higher height allows you to slow the flow. • A product that has a speed control valve is listed below.  Lie in such a way th warm water before the coffee enema. Once completed, clamp the tube and remove the catheter. Move to the toilet and evacuate the enema. • Remember, “accidents” can happen to even experienced patients - be kind and gentle in this procedure.     Clean the en

## 2021-04-16 NOTE — PROGRESS NOTES
Roshni Waters is a 60year old female. Patient presents with:  New Patient  Integrative Medicine Consult: referred by Dr. Leslie Walls      HPI:     Diagnosed with breast cancer in Jan. 2021. Stage 1A. Had lumpectomy and radiation.    [place] total) by mouth daily.  60 tablet 0       SOCIAL HISTORY:   Social History    Socioeconomic History      Marital status:       Spouse name: Not on file      Number of children: Not on file      Years of education: Not on file      Highest education l • Lumpectomy right  2021   •      • Other      discectomy lumbar   • Spine surgery procedure unlisted      herniated disc at [facility]        PHYSICAL EXAM:      21  1107   BP: 112/74   Pulse: 98   SpO2: 97%   Weight: 160 l C) - Breast, right, 1. right breast partial mastectomy, medium length is medial                     margin, short length is anterior margin, long length is deep margin                                 D) - Tissue, 2.  Axillary tissue adjacent to se identified                              Lobular Carcinoma In Situ (LCIS):    Not identified                              Tumor Extent:                                 Lymphovascular Invasion:    Not identified                              Dermal Lymphovasc Biopsy:                                   Progesterone Receptor (PgR) Status:    Positive                                  Percentage of Cells with Nuclear Positivity:    90 %                             Breast Biomarker Testing Performed on Previous Biops recollection and testing by a different method should be considered.     Test performed using the Abbott Alinity SARS-CoV-2 assay on the [employer], [employer] [place], [address]    This test is being used under the Food and Drug Adm 01/12/2021 12:55 PM                                 Hematology Oncology                                                          Pathologist:           Felix Reid MD                                                             Specimen:    Breast, right Non- 01/07/2021 76  >=60 Final   • GFR, -American 01/07/2021 87  >=60 Final   • ALT 01/07/2021 21  13 - 56 U/L Final   • AST 01/07/2021 11* 15 - 37 U/L Final   • Alkaline Phosphatase 01/07/2021 98  50 - 130 U/L Final   • Bilirubin, T Crow Barger DO on 2/16/2021 at 8:32 AM     Finalized by (CST): Gita Camacho DO on 2/16/2021 at 8:51 AM          XR DEXA BONE DENSITOMETRY (CPT=77080)    Result Date: 3/23/2021  CONCLUSION:   1. Osteopenia.   2. FRAX 10 year risk fracture asse pre-existing injuries or medical conditions. The patient agrees that the [facility] and its affiliates and its [facility] are not liable for the patients use of the Products.  [address] [place] [address] makes no representations or warranti and lentils are easy to cook with, and they’re loaded with protein too. Healthy oils: Stock up on oils like olive oil, flax oil, and coconut oil. They all come in handy for different occasions and can be used in everything from salads to baking.     Nuts It’s just about how you approach it and want to make it happen. You have to have a desire to eat better, feel better, and live longer. Here are some ways you can maintain your motivation and increase your knowledge about eating a plant-based diet.     Re

## 2021-04-19 ENCOUNTER — LAB ENCOUNTER (OUTPATIENT)
Dept: LAB | Facility: HOSPITAL | Age: 66
End: 2021-04-19
Attending: NURSE PRACTITIONER
Payer: COMMERCIAL

## 2021-04-19 DIAGNOSIS — K92.1 HEMATOCHEZIA: ICD-10-CM

## 2021-04-19 DIAGNOSIS — Z86.010 HISTORY OF COLON POLYPS: ICD-10-CM

## 2021-04-19 DIAGNOSIS — K64.9 HEMORRHOIDS, UNSPECIFIED HEMORRHOID TYPE: ICD-10-CM

## 2021-04-19 DIAGNOSIS — K59.00 CONSTIPATION, UNSPECIFIED CONSTIPATION TYPE: ICD-10-CM

## 2021-04-19 PROCEDURE — 85025 COMPLETE CBC W/AUTO DIFF WBC: CPT

## 2021-04-19 PROCEDURE — 36415 COLL VENOUS BLD VENIPUNCTURE: CPT

## 2021-04-20 ENCOUNTER — OFFICE VISIT (OUTPATIENT)
Dept: PHYSICAL THERAPY | Age: 66
End: 2021-04-20
Attending: PODIATRIST
Payer: COMMERCIAL

## 2021-04-20 ENCOUNTER — APPOINTMENT (OUTPATIENT)
Dept: PHYSICAL THERAPY | Age: 66
End: 2021-04-20
Attending: NURSE PRACTITIONER
Payer: COMMERCIAL

## 2021-04-20 PROCEDURE — 97110 THERAPEUTIC EXERCISES: CPT

## 2021-04-20 NOTE — PROGRESS NOTES
DISCHARGE SUMMARY    Diagnosis: Left foot pain (M17.977)  Chronic bilateral low back pain without sciatica (M54.5,G89.29)     Insurance (Authorized # of Visits):   O 8 visits           Authorizing Physician: KAELA Lezama Next MD visit: none brendon 5/5  Abduction: R 5/5 L5/5    Flexion: R 5/5 L5/5  Extension: R 5/5 L 5/5    DF: R 5/5 L 5/5  PF: R 5/5 L 4+/5 (painful)  INV: R 5/5 L5/5  EV: R 5/5 L 5/5      * Indicates pain      Date: 04/01/21   TX#: 2/8 Date: 04/06/21              TX#: 3/8 Date: 04/08 options and has agreed to actively participate in planning and for this course of care.     Thank you for your referral. Please co-sign or sign and return this letter via fax as soon as possible to [phone].  If you have any questions, please contact m

## 2021-04-23 ENCOUNTER — OFFICE VISIT (OUTPATIENT)
Dept: PODIATRY CLINIC | Facility: CLINIC | Age: 66
End: 2021-04-23
Payer: COMMERCIAL

## 2021-04-23 VITALS — HEART RATE: 85 BPM | DIASTOLIC BLOOD PRESSURE: 71 MMHG | SYSTOLIC BLOOD PRESSURE: 120 MMHG

## 2021-04-23 DIAGNOSIS — M79.672 LEFT FOOT PAIN: ICD-10-CM

## 2021-04-23 DIAGNOSIS — G57.62 MORTON'S METATARSALGIA, NEURALGIA, OR NEUROMA, LEFT: ICD-10-CM

## 2021-04-23 DIAGNOSIS — G57.82 NEUROMA OF THIRD INTERSPACE OF LEFT FOOT: Primary | ICD-10-CM

## 2021-04-23 PROCEDURE — 3078F DIAST BP <80 MM HG: CPT | Performed by: PODIATRIST

## 2021-04-23 PROCEDURE — 3074F SYST BP LT 130 MM HG: CPT | Performed by: PODIATRIST

## 2021-04-23 PROCEDURE — 64455 NJX AA&/STRD PLTR COM DG NRV: CPT | Performed by: PODIATRIST

## 2021-04-23 RX ORDER — TRIAMCINOLONE ACETONIDE 40 MG/ML
20 INJECTION, SUSPENSION INTRA-ARTICULAR; INTRAMUSCULAR ONCE
Status: COMPLETED | OUTPATIENT
Start: 2021-04-23 | End: 2021-04-23

## 2021-04-23 RX ADMIN — TRIAMCINOLONE ACETONIDE 20 MG: 40 INJECTION, SUSPENSION INTRA-ARTICULAR; INTRAMUSCULAR at 15:45:00

## 2021-04-23 NOTE — PROGRESS NOTES
Ender Bhatt is a 60year old female. Patient presents with:  Neuroma: left foot follow up cortisone injection, symptoms improved        HPI:   Patient returns to the clinic she has about 60% improvement in symptoms overall with the injection.   At today's v • Breast Cancer Maternal Cousin Female 48      Social History    Socioeconomic History      Marital status:       Spouse name: Not on file      Number of children: Not on file      Years of education: Not on file      Highest education level: Not mg        Plan: The patient was consented for and after timeout was taken a cortisone injection with peripheral nerve block was administered into the third interspace of the left foot.   This was accomplished utilizing 20 mg of Kenalog and 0.5 cc of 0.5% Ma

## 2021-04-27 ENCOUNTER — IMMUNIZATION (OUTPATIENT)
Dept: LAB | Age: 66
End: 2021-04-27
Attending: HOSPITALIST
Payer: COMMERCIAL

## 2021-04-27 DIAGNOSIS — Z23 NEED FOR VACCINATION: Primary | ICD-10-CM

## 2021-04-27 PROCEDURE — 0001A SARSCOV2 VAC 30MCG/0.3ML IM: CPT

## 2021-04-28 ENCOUNTER — OFFICE VISIT (OUTPATIENT)
Dept: HEMATOLOGY/ONCOLOGY | Facility: HOSPITAL | Age: 66
End: 2021-04-28
Payer: COMMERCIAL

## 2021-04-28 ENCOUNTER — NURSE ONLY (OUTPATIENT)
Dept: HEMATOLOGY/ONCOLOGY | Facility: HOSPITAL | Age: 66
End: 2021-04-28
Payer: COMMERCIAL

## 2021-04-28 VITALS
WEIGHT: 160 LBS | DIASTOLIC BLOOD PRESSURE: 70 MMHG | TEMPERATURE: 98 F | OXYGEN SATURATION: 99 % | BODY MASS INDEX: 26.66 KG/M2 | HEIGHT: 65 IN | HEART RATE: 92 BPM | RESPIRATION RATE: 16 BRPM | SYSTOLIC BLOOD PRESSURE: 130 MMHG

## 2021-04-28 DIAGNOSIS — Z17.0 MALIGNANT NEOPLASM OF UPPER-INNER QUADRANT OF RIGHT BREAST IN FEMALE, ESTROGEN RECEPTOR POSITIVE (HCC): Primary | ICD-10-CM

## 2021-04-28 DIAGNOSIS — C50.211 MALIGNANT NEOPLASM OF UPPER-INNER QUADRANT OF RIGHT BREAST IN FEMALE, ESTROGEN RECEPTOR POSITIVE (HCC): Primary | ICD-10-CM

## 2021-04-28 DIAGNOSIS — Z51.81 ENCOUNTER FOR MONITORING AROMATASE INHIBITOR THERAPY: ICD-10-CM

## 2021-04-28 DIAGNOSIS — Z79.811 ENCOUNTER FOR MONITORING AROMATASE INHIBITOR THERAPY: ICD-10-CM

## 2021-04-28 DIAGNOSIS — M85.80 OSTEOPENIA, UNSPECIFIED LOCATION: ICD-10-CM

## 2021-04-28 PROCEDURE — 99214 OFFICE O/P EST MOD 30 MIN: CPT | Performed by: INTERNAL MEDICINE

## 2021-04-28 PROCEDURE — 36415 COLL VENOUS BLD VENIPUNCTURE: CPT

## 2021-04-28 RX ORDER — ANASTROZOLE 1 MG/1
1 TABLET ORAL DAILY
Qty: 30 TABLET | Refills: 6 | Status: SHIPPED | OUTPATIENT
Start: 2021-04-28 | End: 2021-06-24

## 2021-04-28 NOTE — PROGRESS NOTES
HPI     Cornelious Gobble is a 60year old female here for follow up of diagnosis of Malignant neoplasm of upper-inner quadrant of right breast in female, estrogen receptor positive (hcc)  (primary encounter diagnosis)  Encounter for monitoring aromatase inhib • APPENDECTOMY     • COLONOSCOPY  2017    They found polyps.  My aunt  of colon cancer at 64   • LUMPECTOMY RIGHT  2021   •      • OTHER      discectomy lumbar   • SPINE SURGERY PROCEDURE UNLISTED      herniated disc at [facility] Pathologic stage from 3/2/2021: Stage IA (pT1c, pN0(sn), cM0, G1, ER+, WA+, HER2-, Oncotype DX score: 12) - Signed by Deirdre Tapia MD on 3/2/2021      Leeanne Rowland is a 60year old female with ER/WA positive breast cancer, node negative.     S/p lumpectom performed on a Hologic dual energy x-ray absorptiometry scanner.       FINDINGS:      LEFT FEMORAL NECK   BMD: 0.632 gm/sq. cm. T SCORE: -2.0 Z SCORE: -0.5      LEFT TOTAL HIP   BMD: 0.793 gm/sq. cm. T SCORE: -1.2 Z SCORE: 0.0      PA LUMBAR SPINE (L1 - L4)

## 2021-05-02 ENCOUNTER — LAB ENCOUNTER (OUTPATIENT)
Dept: LAB | Facility: HOSPITAL | Age: 66
End: 2021-05-02
Attending: INTERNAL MEDICINE
Payer: COMMERCIAL

## 2021-05-02 DIAGNOSIS — Z01.818 PRE-OP TESTING: ICD-10-CM

## 2021-05-04 ENCOUNTER — APPOINTMENT (OUTPATIENT)
Dept: PHYSICAL THERAPY | Facility: HOSPITAL | Age: 66
End: 2021-05-04
Attending: SURGERY
Payer: COMMERCIAL

## 2021-05-04 ENCOUNTER — TELEPHONE (OUTPATIENT)
Dept: GASTROENTEROLOGY | Facility: CLINIC | Age: 66
End: 2021-05-04

## 2021-05-04 NOTE — TELEPHONE ENCOUNTER
Pt has procedure tomorrow and needs clarification on prep and also she said she got a call to come earlier.  Please clarify

## 2021-05-04 NOTE — TELEPHONE ENCOUNTER
I called and spoke to Kelly Parr RN at the [facility] location    She states she spoke to the pt and confirmed she would like her there at 9:30-9:45 tomorrow morning. The pt verbalizes understanding.     We verbally went over some of her prep instructions over the audie

## 2021-05-05 ENCOUNTER — ANESTHESIA (OUTPATIENT)
Dept: ENDOSCOPY | Age: 66
End: 2021-05-05
Payer: COMMERCIAL

## 2021-05-05 ENCOUNTER — HOSPITAL ENCOUNTER (OUTPATIENT)
Age: 66
Setting detail: HOSPITAL OUTPATIENT SURGERY
Discharge: HOME | End: 2021-05-05
Attending: INTERNAL MEDICINE | Admitting: INTERNAL MEDICINE
Payer: COMMERCIAL

## 2021-05-05 ENCOUNTER — ANESTHESIA EVENT (OUTPATIENT)
Dept: ENDOSCOPY | Age: 66
End: 2021-05-05
Payer: COMMERCIAL

## 2021-05-05 VITALS
WEIGHT: 160 LBS | DIASTOLIC BLOOD PRESSURE: 68 MMHG | HEART RATE: 77 BPM | BODY MASS INDEX: 27.31 KG/M2 | SYSTOLIC BLOOD PRESSURE: 115 MMHG | HEIGHT: 64 IN | RESPIRATION RATE: 12 BRPM | TEMPERATURE: 97 F | OXYGEN SATURATION: 97 %

## 2021-05-05 DIAGNOSIS — Z86.010 HISTORY OF COLON POLYPS: ICD-10-CM

## 2021-05-05 DIAGNOSIS — Z01.818 PRE-OP TESTING: Primary | ICD-10-CM

## 2021-05-05 DIAGNOSIS — K64.9 HEMORRHOIDS, UNSPECIFIED HEMORRHOID TYPE: ICD-10-CM

## 2021-05-05 DIAGNOSIS — K92.1 HEMATOCHEZIA: ICD-10-CM

## 2021-05-05 DIAGNOSIS — K59.00 CONSTIPATION, UNSPECIFIED CONSTIPATION TYPE: ICD-10-CM

## 2021-05-05 PROCEDURE — 45380 COLONOSCOPY AND BIOPSY: CPT | Performed by: INTERNAL MEDICINE

## 2021-05-05 PROCEDURE — 45385 COLONOSCOPY W/LESION REMOVAL: CPT | Performed by: INTERNAL MEDICINE

## 2021-05-05 RX ORDER — SODIUM CHLORIDE, SODIUM LACTATE, POTASSIUM CHLORIDE, CALCIUM CHLORIDE 600; 310; 30; 20 MG/100ML; MG/100ML; MG/100ML; MG/100ML
INJECTION, SOLUTION INTRAVENOUS CONTINUOUS
Status: DISCONTINUED | OUTPATIENT
Start: 2021-05-05 | End: 2021-05-05

## 2021-05-05 RX ORDER — LIDOCAINE HYDROCHLORIDE 10 MG/ML
INJECTION, SOLUTION EPIDURAL; INFILTRATION; INTRACAUDAL; PERINEURAL AS NEEDED
Status: DISCONTINUED | OUTPATIENT
Start: 2021-05-05 | End: 2021-05-05 | Stop reason: SURG

## 2021-05-05 RX ADMIN — LIDOCAINE HYDROCHLORIDE 50 MG: 10 INJECTION, SOLUTION EPIDURAL; INFILTRATION; INTRACAUDAL; PERINEURAL at 10:40:00

## 2021-05-05 NOTE — OPERATIVE REPORT
COLONOSCOPY REPORT    Ramiroyumiko Henok     1955 Age 60year old   PCP No primary care provider on file.  Kenneth Tidwell MD     Date of procedure: 21    Procedure: Colonoscopy w/ cold snare and cold biopsy polypectomy    Pre-operative diag retrieved. C. **3 mm polyp in the rectal colon; flat morphology; cold biopsy polypectomy and retrieved. 2. Diverticulosis: left sided.     3. Terminal ileum: the visualized mucosa appeared normal.    4. A retroflexed view of the rectum revealed hemo

## 2021-05-05 NOTE — ANESTHESIA POSTPROCEDURE EVALUATION
Patient: Pepper Urena    Procedure Summary     Date: 05/05/21 Room / Location: [facility] 01 / [facility] 1 ENDO    Anesthesia Start: 1510 Anesthesia Stop: 4015    Procedure: COLONOSCOPY (N/A ) Diagnosis:       Constipation, unspecified constipation type

## 2021-05-05 NOTE — ANESTHESIA PREPROCEDURE EVALUATION
Anesthesia PreOp Note    HPI:     Romeo Gomez is a 60year old female who presents for preoperative consultation requested by: Estela Mueller MD    Date of Surgery: 5/5/2021    Procedure(s):  COLONOSCOPY  Indication: Constipation, History of colon polyps, H Injection Solution Auto-injector, Inject 0.3 mg into the skin., Disp: , Rfl:   valACYclovir HCl 500 MG Oral Tab, Take 2 tablets (1,000 mg total) by mouth daily. , Disp: 60 tablet, Rfl: 0, 5/3/2021  Na Sulfate-K Sulfate-Mg Sulf (SUPREP BOWEL PREP KIT) 17.5-3 Transportation (Medical):       Lack of Transportation (Non-Medical):   Physical Activity:       Days of Exercise per Week:       Minutes of Exercise per Session:   Stress:       Feeling of Stress :   Social Connections:       Frequency of Communication wi anesthetic plan, benefits, risks including possible dental damage if relevant, major complications, and any alternative forms of anesthetic management. All of the patient's questions were answered to the best of my ability.  The patient desires the anesth

## 2021-05-17 ENCOUNTER — APPOINTMENT (OUTPATIENT)
Dept: RADIATION ONCOLOGY | Facility: HOSPITAL | Age: 66
End: 2021-05-17
Attending: RADIOLOGY
Payer: COMMERCIAL

## 2021-05-20 ENCOUNTER — OFFICE VISIT (OUTPATIENT)
Dept: RADIATION ONCOLOGY | Facility: HOSPITAL | Age: 66
End: 2021-05-20
Attending: RADIOLOGY
Payer: COMMERCIAL

## 2021-05-20 ENCOUNTER — TELEPHONE (OUTPATIENT)
Dept: HEMATOLOGY/ONCOLOGY | Facility: HOSPITAL | Age: 66
End: 2021-05-20

## 2021-05-20 VITALS
TEMPERATURE: 98 F | DIASTOLIC BLOOD PRESSURE: 82 MMHG | SYSTOLIC BLOOD PRESSURE: 124 MMHG | RESPIRATION RATE: 18 BRPM | HEART RATE: 89 BPM

## 2021-05-20 PROCEDURE — 99211 OFF/OP EST MAY X REQ PHY/QHP: CPT

## 2021-05-20 RX ORDER — VENLAFAXINE HYDROCHLORIDE 37.5 MG/1
37.5 CAPSULE, EXTENDED RELEASE ORAL DAILY
Qty: 30 CAPSULE | Refills: 5 | Status: SHIPPED | OUTPATIENT
Start: 2021-05-20 | End: 2021-06-24

## 2021-05-20 NOTE — TELEPHONE ENCOUNTER
Discussed with Dr. Gaston Sykes pt status. Attempted to contact by phone and mailbox full. Will send [employer] message.

## 2021-05-20 NOTE — PROGRESS NOTES
Pt seen in 1 month follow up with Dr Devan García, having completed radiation to the R breast 4/15/21. Skin well healed. Had to stop her anastrozole due to severe lability. Pt states she was unable to hold a conversation without sobbing.  Since stopping the medicatio

## 2021-05-20 NOTE — PROGRESS NOTES
RADIATION ONCOLOGY NOTE    DATE OF VISIT: 5/20/2021    DIAGNOSIS :  Stage  IA T1c N0sn M0, right breast cancer status post lumpectomy and sentinel lymph node biopsy recovering well, receptor strongly positive for adjuvant hormonal blockade and now s/p adju

## 2021-05-21 ENCOUNTER — IMMUNIZATION (OUTPATIENT)
Dept: LAB | Age: 66
End: 2021-05-21
Attending: HOSPITALIST
Payer: COMMERCIAL

## 2021-05-21 DIAGNOSIS — Z23 NEED FOR VACCINATION: Primary | ICD-10-CM

## 2021-05-21 PROCEDURE — 0002A SARSCOV2 VAC 30MCG/0.3ML IM: CPT

## 2021-05-25 ENCOUNTER — OFFICE VISIT (OUTPATIENT)
Dept: HEMATOLOGY/ONCOLOGY | Facility: HOSPITAL | Age: 66
End: 2021-05-25
Attending: INTERNAL MEDICINE
Payer: COMMERCIAL

## 2021-05-25 ENCOUNTER — OFFICE VISIT (OUTPATIENT)
Dept: HEMATOLOGY/ONCOLOGY | Facility: HOSPITAL | Age: 66
End: 2021-05-25
Payer: COMMERCIAL

## 2021-05-25 VITALS
DIASTOLIC BLOOD PRESSURE: 78 MMHG | BODY MASS INDEX: 26.16 KG/M2 | SYSTOLIC BLOOD PRESSURE: 123 MMHG | HEIGHT: 65 IN | HEART RATE: 85 BPM | RESPIRATION RATE: 16 BRPM | WEIGHT: 157 LBS | TEMPERATURE: 98 F | OXYGEN SATURATION: 96 %

## 2021-05-25 DIAGNOSIS — C50.211 MALIGNANT NEOPLASM OF UPPER-INNER QUADRANT OF RIGHT BREAST IN FEMALE, ESTROGEN RECEPTOR POSITIVE (HCC): Primary | ICD-10-CM

## 2021-05-25 DIAGNOSIS — Z17.0 MALIGNANT NEOPLASM OF UPPER-INNER QUADRANT OF RIGHT BREAST IN FEMALE, ESTROGEN RECEPTOR POSITIVE (HCC): Primary | ICD-10-CM

## 2021-05-25 DIAGNOSIS — Z79.811 ENCOUNTER FOR MONITORING AROMATASE INHIBITOR THERAPY: ICD-10-CM

## 2021-05-25 DIAGNOSIS — Z08 ENCOUNTER FOR FOLLOW-UP EXAMINATION AFTER COMPLETED TREATMENT FOR MALIGNANT NEOPLASM: ICD-10-CM

## 2021-05-25 DIAGNOSIS — Z51.81 ENCOUNTER FOR MONITORING AROMATASE INHIBITOR THERAPY: ICD-10-CM

## 2021-05-25 DIAGNOSIS — Z71.9 COUNSELING, UNSPECIFIED: ICD-10-CM

## 2021-05-25 PROCEDURE — 99214 OFFICE O/P EST MOD 30 MIN: CPT | Performed by: INTERNAL MEDICINE

## 2021-05-25 PROCEDURE — 99215 OFFICE O/P EST HI 40 MIN: CPT | Performed by: NURSE PRACTITIONER

## 2021-05-25 NOTE — PROGRESS NOTES
KRISTEN     Halley Whiting is a 73year old female here for follow up of diagnosis of Malignant neoplasm of upper-inner quadrant of right breast in female, estrogen receptor positive (hcc)  (primary encounter diagnosis)  Encounter for monitoring aromatase inhib 5/20/2021 ) 30 tablet 6   • mometasone 0.1 % External Ointment Apply to right breast TID for radiation dermatitis (Patient not taking: Reported on 5/20/2021 ) 45 g 2   • EPINEPHrine 0.3 MG/0.3ML Injection Solution Auto-injector Inject 0.3 mg into the skin. 5\")   Wt 71.2 kg (157 lb)   LMP  (LMP Unknown)   SpO2 96%   BMI 26.13 kg/m²     Physical Exam  Deferred.         ASSESSMENT/PLAN:   Malignant neoplasm of upper-inner quadrant of right breast in female, estrogen receptor positive (hcc)  (primary encounter d age/gender appropriate, reduce salt in the diet and cooking, reduce exposure to stress, improve dietary compliance and continue current medications.   Information for the [facility] and programs to support the above recommendations was provided to the p

## 2021-05-25 NOTE — PROGRESS NOTES
I met with Mahendra Slater for a Survivorship Clinic visit to provide a survivorship care plan (SCP) and information related to post-treatment care. She has a diagnosis of Stage IA ER/NH positive, HER2- right breast cancer.   She had a right lumpectomy with sentinel begin the Venlafaxine soon. She discussed the many challenging personal issues she has had over the years. She is hoping to get her necessary appointments arranged, then return to [facility] this summer to be with her .   She plans to travel here e noted swelling. She did see the PT lymphedema team but missed last appointment. She had wanted a compression sleeve for travel so was encouraged to contact Fernando Marcus in PT to get this arranged.      Reviewed common cancer survivor issues and resources availabl survivorship programs, individual counseling  -[facility] [facility], Jump Start Lifestyle Program (virtual)  -[employer].gov handout-nutrition, physical activity  -ACS Cancer Screening Guidelines  -[facility] Breast Support Group  -Websites: [employer]

## 2021-05-26 RX ORDER — GLUCOSAMINE HCL 500 MG
TABLET ORAL DAILY PRN
COMMUNITY

## 2021-05-27 ENCOUNTER — TELEPHONE (OUTPATIENT)
Dept: GASTROENTEROLOGY | Facility: CLINIC | Age: 66
End: 2021-05-27

## 2021-05-27 DIAGNOSIS — Z17.0 MALIGNANT NEOPLASM OF UPPER-INNER QUADRANT OF RIGHT BREAST IN FEMALE, ESTROGEN RECEPTOR POSITIVE (HCC): Primary | ICD-10-CM

## 2021-05-27 DIAGNOSIS — C50.211 MALIGNANT NEOPLASM OF UPPER-INNER QUADRANT OF RIGHT BREAST IN FEMALE, ESTROGEN RECEPTOR POSITIVE (HCC): Primary | ICD-10-CM

## 2021-05-27 NOTE — TELEPHONE ENCOUNTER
----- Message from Tameka Rosas MD sent at 5/27/2021  2:20 PM CDT -----  GI staff: please place recall for colonoscopy in 3 years

## 2021-05-27 NOTE — TELEPHONE ENCOUNTER
Entered into [facility]. Recall CLN in 3 years per Dr. Lisbeth Griggs. Last CLN done 05/05/2021. Recall entered into Patient Outreach for 05/05/2024. HM updated.

## 2021-06-10 ENCOUNTER — OFFICE VISIT (OUTPATIENT)
Dept: FAMILY MEDICINE CLINIC | Facility: CLINIC | Age: 66
End: 2021-06-10
Payer: COMMERCIAL

## 2021-06-10 ENCOUNTER — OFFICE VISIT (OUTPATIENT)
Dept: PODIATRY CLINIC | Facility: CLINIC | Age: 66
End: 2021-06-10
Payer: COMMERCIAL

## 2021-06-10 VITALS
BODY MASS INDEX: 26.66 KG/M2 | HEIGHT: 65 IN | DIASTOLIC BLOOD PRESSURE: 72 MMHG | WEIGHT: 160 LBS | SYSTOLIC BLOOD PRESSURE: 114 MMHG | HEART RATE: 90 BPM | OXYGEN SATURATION: 96 %

## 2021-06-10 VITALS — HEART RATE: 70 BPM | RESPIRATION RATE: 18 BRPM | SYSTOLIC BLOOD PRESSURE: 102 MMHG | DIASTOLIC BLOOD PRESSURE: 76 MMHG

## 2021-06-10 DIAGNOSIS — L82.1 SEBORRHEIC KERATOSES: ICD-10-CM

## 2021-06-10 DIAGNOSIS — R40.0 DAYTIME SLEEPINESS: ICD-10-CM

## 2021-06-10 DIAGNOSIS — G57.82 NEUROMA OF THIRD INTERSPACE OF LEFT FOOT: Primary | ICD-10-CM

## 2021-06-10 DIAGNOSIS — R06.83 SNORING: Primary | ICD-10-CM

## 2021-06-10 DIAGNOSIS — Z86.018 HISTORY OF CHANGING SKIN MOLE: ICD-10-CM

## 2021-06-10 DIAGNOSIS — M79.672 LEFT FOOT PAIN: ICD-10-CM

## 2021-06-10 PROCEDURE — 3074F SYST BP LT 130 MM HG: CPT | Performed by: NURSE PRACTITIONER

## 2021-06-10 PROCEDURE — 99213 OFFICE O/P EST LOW 20 MIN: CPT | Performed by: NURSE PRACTITIONER

## 2021-06-10 PROCEDURE — 64455 NJX AA&/STRD PLTR COM DG NRV: CPT | Performed by: PODIATRIST

## 2021-06-10 PROCEDURE — 3078F DIAST BP <80 MM HG: CPT | Performed by: PODIATRIST

## 2021-06-10 PROCEDURE — 3008F BODY MASS INDEX DOCD: CPT | Performed by: NURSE PRACTITIONER

## 2021-06-10 PROCEDURE — 3074F SYST BP LT 130 MM HG: CPT | Performed by: PODIATRIST

## 2021-06-10 PROCEDURE — 3078F DIAST BP <80 MM HG: CPT | Performed by: NURSE PRACTITIONER

## 2021-06-10 RX ORDER — TRIAMCINOLONE ACETONIDE 40 MG/ML
20 INJECTION, SUSPENSION INTRA-ARTICULAR; INTRAMUSCULAR ONCE
Status: COMPLETED | OUTPATIENT
Start: 2021-06-10 | End: 2021-06-10

## 2021-06-10 RX ORDER — MULTIVITAMIN
TABLET ORAL
COMMUNITY

## 2021-06-10 RX ADMIN — TRIAMCINOLONE ACETONIDE 20 MG: 40 INJECTION, SUSPENSION INTRA-ARTICULAR; INTRAMUSCULAR at 17:37:00

## 2021-06-10 NOTE — PROGRESS NOTES
Per Dr. Ashly Clark, draw up 0.5 cc of 0.5 % Marcaine and 0.5 cc of Kenalog 40 for injection to left foot. Yumiko Moore  Patient provided education handout for cortisone injection. Patient left office prior to obtaining post injection vitals.

## 2021-06-10 NOTE — PROGRESS NOTES
Chief Complaint:   Patient presents with:  Sleep Problem: c/c snoring for about 10 years she would like to have a sleep study test done   Derm Problem: c/c per patient she has been noticing a lot of skin tags all over her body wants to see if she should be Specimens:   A) - Colon ascending, Polyps x 3                                                                    B) - Colon transverse, Polyp x 1                                                                    C) - Rectum, Polyp x 1 COLONOSCOPY      They found polyps.  My aunt  of colon cancer at 64   • COLONOSCOPY N/A 2021    Procedure: COLONOSCOPY;  Surgeon: Beau Santana MD;  Location: [address] [place] [address] 1 ENDO   • LUMPECTOMY RIGHT  2021   •      • OTHER      discectomy get Answered  Comment: very rare-socially       REVIEW OF SYSTEMS:   CONSTITUTIONAL:  Denies unusual weight gain/loss, fever, or chills. Complains of daytime sleepiness, snoring, and frequent night time awakening. INTEGUMENTARY:  Denies rashes, itching.  New if not can schedule when she returns. - OP [facility] ALT REFERRAL HOME SLEEP APNEA TEST  - GENERAL SLEEP STUDY TRANSCRIPTION    2. Daytime sleepiness  -See above   - OP [facility] ALT REFERRAL HOME SLEEP APNEA TEST  - GENERAL SLEEP STUDY TRANSCRIPTION    3.  History o

## 2021-06-13 NOTE — PROGRESS NOTES
Annel De Oliveira is a 73year old female. Patient presents with: Follow - Up: Ongoing left foot pain. Cortisone injection helped but pain is coming back. On and off swelling. Pain scale 4/10. Pain scale 6/7-10 with activity.          HPI:   Patient returns to t COLONOSCOPY;  Surgeon: Laurie Toussaint MD;  Location: [facility] ENDO   • LUMPECTOMY RIGHT  2021   •      • OTHER      discectomy lumbar   • SPINE SURGERY PROCEDURE UNLISTED      herniated disc at [facility]       Family History   Problem R neuroma. 4. Musculoskeletal: The patient has good muscle strength.     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

## 2021-06-16 ENCOUNTER — OFFICE VISIT (OUTPATIENT)
Dept: SURGERY | Facility: CLINIC | Age: 66
End: 2021-06-16
Payer: COMMERCIAL

## 2021-06-16 VITALS — HEIGHT: 65 IN | WEIGHT: 160 LBS | BODY MASS INDEX: 26.66 KG/M2

## 2021-06-16 DIAGNOSIS — Z85.3 PERSONAL HISTORY OF BREAST CANCER: Primary | ICD-10-CM

## 2021-06-16 PROCEDURE — 3008F BODY MASS INDEX DOCD: CPT | Performed by: SURGERY

## 2021-06-16 PROCEDURE — 99214 OFFICE O/P EST MOD 30 MIN: CPT | Performed by: SURGERY

## 2021-06-16 NOTE — PROGRESS NOTES
HPI/Subjective:   Nany Calderon is a 73year old female who presents for CBE. She is s/p lumpectomy and SN procedure on 2/16/2021. She underwent radiation post op.  She tried anastrozole, but discontinued it due to an overwhelming feeling of sadness occurring congestion, sinus pain or ST  LUNGS: denies shortness of breath with exertion  CARDIOVASCULAR: denies chest pain on exertion  GI: no hematemesis, no BRBPR, no worsening heartburn  : no dysuria, no blood in urine, no difficulty urinating  MUSCULOSKELETAL: There are no clinical signs of breast cancer recurrence or breast pathology. Jenna Yee will follow up with Dr. Catherine Sharp regarding endocrine therapy. She will return to clinic for clinical breast exam after her mammogram, sooner if needed.   She will continue her

## 2021-06-24 ENCOUNTER — HOSPITAL ENCOUNTER (OUTPATIENT)
Age: 66
Discharge: HOME | End: 2021-06-24
Payer: COMMERCIAL

## 2021-06-24 ENCOUNTER — OFFICE VISIT (OUTPATIENT)
Dept: HEMATOLOGY/ONCOLOGY | Facility: HOSPITAL | Age: 66
End: 2021-06-24
Attending: INTERNAL MEDICINE
Payer: COMMERCIAL

## 2021-06-24 VITALS
BODY MASS INDEX: 26.98 KG/M2 | DIASTOLIC BLOOD PRESSURE: 81 MMHG | RESPIRATION RATE: 17 BRPM | HEART RATE: 86 BPM | WEIGHT: 158 LBS | OXYGEN SATURATION: 99 % | HEIGHT: 64 IN | TEMPERATURE: 98 F | SYSTOLIC BLOOD PRESSURE: 130 MMHG

## 2021-06-24 VITALS
HEART RATE: 93 BPM | OXYGEN SATURATION: 97 % | SYSTOLIC BLOOD PRESSURE: 113 MMHG | TEMPERATURE: 99 F | DIASTOLIC BLOOD PRESSURE: 65 MMHG | RESPIRATION RATE: 16 BRPM | HEIGHT: 65 IN | BODY MASS INDEX: 26.33 KG/M2 | WEIGHT: 158 LBS

## 2021-06-24 DIAGNOSIS — Z91.89 AT INCREASED RISK OF EXPOSURE TO COVID-19 VIRUS: Primary | ICD-10-CM

## 2021-06-24 DIAGNOSIS — Z51.81 ENCOUNTER FOR MONITORING AROMATASE INHIBITOR THERAPY: ICD-10-CM

## 2021-06-24 DIAGNOSIS — Z79.811 ENCOUNTER FOR MONITORING AROMATASE INHIBITOR THERAPY: ICD-10-CM

## 2021-06-24 DIAGNOSIS — Z17.0 MALIGNANT NEOPLASM OF UPPER-INNER QUADRANT OF RIGHT BREAST IN FEMALE, ESTROGEN RECEPTOR POSITIVE (HCC): Primary | ICD-10-CM

## 2021-06-24 DIAGNOSIS — C50.211 MALIGNANT NEOPLASM OF UPPER-INNER QUADRANT OF RIGHT BREAST IN FEMALE, ESTROGEN RECEPTOR POSITIVE (HCC): Primary | ICD-10-CM

## 2021-06-24 PROCEDURE — 99214 OFFICE O/P EST MOD 30 MIN: CPT | Performed by: INTERNAL MEDICINE

## 2021-06-24 PROCEDURE — 99202 OFFICE O/P NEW SF 15 MIN: CPT | Performed by: PHYSICIAN ASSISTANT

## 2021-06-24 PROCEDURE — U0002 COVID-19 LAB TEST NON-CDC: HCPCS | Performed by: PHYSICIAN ASSISTANT

## 2021-06-24 RX ORDER — ANASTROZOLE 1 MG/1
1 TABLET ORAL DAILY
Qty: 90 TABLET | Refills: 3 | Status: SHIPPED | OUTPATIENT
Start: 2021-06-24 | End: 2021-10-13

## 2021-06-24 NOTE — PROGRESS NOTES
KRISTEN Rowland is a 73year old female here for follow up of diagnosis of Malignant neoplasm of upper-inner quadrant of right breast in female, estrogen receptor positive (hcc)  (primary encounter diagnosis)  Encounter for monitoring aromatase inhib Penicillins             HIVES    Past Medical History:   Diagnosis Date   • Anxiety state    • Back problem     stenosis   • Breast cancer ([facility] [address] 02/16/2021   • Cancer [facility])    • Colon polyp 5-2017   • Hemorrhoids     external   • Muscle weakness     ri Cancer Staging  Malignant neoplasm of upper-inner quadrant of right breast in female, estrogen receptor positive ([facility] [address]  Staging form: Breast, AJCC 8th Edition  - Clinical stage from 1/27/2021: Stage IA (cT1c, cN0, cM0, G1, ER+, MA+, HER2-) - Signed by

## 2021-06-24 NOTE — ED PROVIDER NOTES
Patient Seen in: [facility]      History   Patient presents with:  Testing    Stated Complaint: testing for travel    HPI/Subjective:   HPI    70-year-old female with past medical history as listed below here for Covid testing.   Patient is t Device None (Room air)       Current:/81   Pulse 86   Temp 97.9 °F (36.6 °C) (Temporal)   Resp 17   Ht 162.6 cm (5' 4\")   Wt 71.7 kg   LMP  (LMP Unknown)   SpO2 99%   BMI 27.12 kg/m²         Physical Exam  Vitals and nursing note reviewed.    Constit

## 2021-10-11 ENCOUNTER — HOSPITAL ENCOUNTER (OUTPATIENT)
Dept: MAMMOGRAPHY | Facility: HOSPITAL | Age: 66
Discharge: HOME | End: 2021-10-11
Attending: INTERNAL MEDICINE
Payer: COMMERCIAL

## 2021-10-11 DIAGNOSIS — C50.211 MALIGNANT NEOPLASM OF UPPER-INNER QUADRANT OF RIGHT BREAST IN FEMALE, ESTROGEN RECEPTOR POSITIVE (HCC): ICD-10-CM

## 2021-10-11 DIAGNOSIS — Z17.0 MALIGNANT NEOPLASM OF UPPER-INNER QUADRANT OF RIGHT BREAST IN FEMALE, ESTROGEN RECEPTOR POSITIVE (HCC): ICD-10-CM

## 2021-10-11 PROCEDURE — 77066 DX MAMMO INCL CAD BI: CPT | Performed by: INTERNAL MEDICINE

## 2021-10-11 PROCEDURE — 77062 BREAST TOMOSYNTHESIS BI: CPT | Performed by: INTERNAL MEDICINE

## 2021-10-13 ENCOUNTER — OFFICE VISIT (OUTPATIENT)
Dept: HEMATOLOGY/ONCOLOGY | Facility: HOSPITAL | Age: 66
End: 2021-10-13
Attending: INTERNAL MEDICINE
Payer: COMMERCIAL

## 2021-10-13 VITALS
HEART RATE: 94 BPM | OXYGEN SATURATION: 94 % | RESPIRATION RATE: 16 BRPM | BODY MASS INDEX: 26.6 KG/M2 | SYSTOLIC BLOOD PRESSURE: 117 MMHG | DIASTOLIC BLOOD PRESSURE: 73 MMHG | HEIGHT: 65 IN | WEIGHT: 159.63 LBS | TEMPERATURE: 98 F

## 2021-10-13 DIAGNOSIS — Z51.81 ENCOUNTER FOR MONITORING AROMATASE INHIBITOR THERAPY: ICD-10-CM

## 2021-10-13 DIAGNOSIS — Z79.811 ENCOUNTER FOR MONITORING AROMATASE INHIBITOR THERAPY: ICD-10-CM

## 2021-10-13 DIAGNOSIS — Z17.0 MALIGNANT NEOPLASM OF UPPER-INNER QUADRANT OF RIGHT BREAST IN FEMALE, ESTROGEN RECEPTOR POSITIVE (HCC): Primary | ICD-10-CM

## 2021-10-13 DIAGNOSIS — C50.211 MALIGNANT NEOPLASM OF UPPER-INNER QUADRANT OF RIGHT BREAST IN FEMALE, ESTROGEN RECEPTOR POSITIVE (HCC): Primary | ICD-10-CM

## 2021-10-13 PROCEDURE — 99214 OFFICE O/P EST MOD 30 MIN: CPT | Performed by: INTERNAL MEDICINE

## 2021-10-13 RX ORDER — LETROZOLE 2.5 MG/1
2.5 TABLET, FILM COATED ORAL DAILY
Qty: 30 TABLET | Refills: 6 | Status: SHIPPED | OUTPATIENT
Start: 2021-10-13

## 2021-10-13 NOTE — PROGRESS NOTES
KRISTEN     Mamta Roe is a 73year old female here for follow up of diagnosis of Malignant neoplasm of upper-inner quadrant of right breast in female, estrogen receptor positive (hcc)  (primary encounter diagnosis)  Encounter for monitoring aromatase inhib B-6 OR) Take by mouth. • Cholecalciferol (VITAMIN D3) 75 MCG (3000 UT) Oral Tab Take by mouth daily as needed. • Calcium Carbonate (CALCIUM 500 OR) Take 1 tablet by mouth daily.      • EPINEPHrine 0.3 MG/0.3ML Injection Solution Auto-injector Inject Cancer Maternal Aunt 62         PHYSICAL EXAM:    /73 (BP Location: Left arm, Patient Position: Sitting, Cuff Size: adult)   Pulse 94   Temp 98.4 °F (36.9 °C) (Oral)   Resp 16   Ht 1.651 m (5' 5\")   Wt 72.4 kg (159 lb 9.6 oz)   LMP  (LMP Unknown) N Will proceed with adjuvant hormonal therapy alone. Off venlafaxine as did not tolerate. She resumed anastrozole from June to July and stopped therapy.   D/w patient data published regarding compliance with adjuvant hormonal therapy that women who device. 3D tomosynthesis was performed and reviewed. BREAST COMPOSITION:   Category b-Scattered areas fibroglandular density.          FINDINGS: On the left there is no evidence of suspicious mass, tumor calcifications, tissue spiculation, skin thi

## 2021-10-26 ENCOUNTER — APPOINTMENT (OUTPATIENT)
Dept: HEMATOLOGY/ONCOLOGY | Facility: HOSPITAL | Age: 66
End: 2021-10-26
Attending: INTERNAL MEDICINE
Payer: COMMERCIAL

## 2021-11-28 ENCOUNTER — HOSPITAL ENCOUNTER (OUTPATIENT)
Age: 66
Discharge: HOME | End: 2021-11-28
Payer: COMMERCIAL

## 2021-11-28 VITALS
HEART RATE: 84 BPM | BODY MASS INDEX: 20.32 KG/M2 | HEIGHT: 72 IN | SYSTOLIC BLOOD PRESSURE: 131 MMHG | TEMPERATURE: 97 F | WEIGHT: 150 LBS | OXYGEN SATURATION: 98 % | RESPIRATION RATE: 18 BRPM | DIASTOLIC BLOOD PRESSURE: 75 MMHG

## 2021-11-28 DIAGNOSIS — Z20.822 LAB TEST NEGATIVE FOR COVID-19 VIRUS: ICD-10-CM

## 2021-11-28 DIAGNOSIS — Z20.822 ENCOUNTER FOR LABORATORY TESTING FOR COVID-19 VIRUS: Primary | ICD-10-CM

## 2021-11-28 PROCEDURE — U0002 COVID-19 LAB TEST NON-CDC: HCPCS | Performed by: NURSE PRACTITIONER

## 2021-11-28 PROCEDURE — 99212 OFFICE O/P EST SF 10 MIN: CPT | Performed by: NURSE PRACTITIONER

## 2021-11-28 NOTE — ED PROVIDER NOTES
Patient Seen in: [facility]    History   Patient presents with:  Covid-19 Test    Stated Complaint: covid test    HPI    Brittani Ramirez is a 73year old female who presents to immediate care requesting testing for COVID-19.   Patient states she Inject 0.3 mg into the skin. valACYclovir HCl 500 MG Oral Tab,  Take 2 tablets (1,000 mg total) by mouth daily.        Family History   Problem Relation Age of Onset   • Heart Disorder Father    • Stroke Father    • Breast Cancer Mother 46   • Cancer Mo moist.  Neck: The neck is supple. There is no evidence of JVD. No meningeal signs. Chest: There is no tenderness to the chest wall. No CVA tenderness bilaterally. Respiratory: Respiratory effort was normal.  There is no rales, wheezes, or rhonchi.   Prema Schlein

## 2021-11-30 ENCOUNTER — HOSPITAL ENCOUNTER (OUTPATIENT)
Age: 66
Discharge: HOME | End: 2021-11-30
Payer: COMMERCIAL

## 2021-11-30 VITALS
RESPIRATION RATE: 18 BRPM | BODY MASS INDEX: 18 KG/M2 | TEMPERATURE: 98 F | DIASTOLIC BLOOD PRESSURE: 68 MMHG | WEIGHT: 150 LBS | HEART RATE: 86 BPM | SYSTOLIC BLOOD PRESSURE: 126 MMHG | OXYGEN SATURATION: 98 %

## 2021-11-30 DIAGNOSIS — Z20.822 ENCOUNTER FOR SCREENING LABORATORY TESTING FOR COVID-19 VIRUS: ICD-10-CM

## 2021-11-30 DIAGNOSIS — Z20.822 LAB TEST NEGATIVE FOR COVID-19 VIRUS: Primary | ICD-10-CM

## 2021-11-30 PROCEDURE — U0002 COVID-19 LAB TEST NON-CDC: HCPCS | Performed by: NURSE PRACTITIONER

## 2021-11-30 PROCEDURE — 99212 OFFICE O/P EST SF 10 MIN: CPT | Performed by: NURSE PRACTITIONER

## 2021-11-30 NOTE — ED PROVIDER NOTES
Patient Seen in: [facility]      History   Patient presents with:  Covid-19 Test    Stated Complaint: covid test    Subjective:   HPI    This is a 71-year-old female fully vaccinated for Covid presenting for Covid testing after exposure no s Pulse 86   Temp 97.9 °F (36.6 °C) (Temporal)   Resp 18   Wt 68 kg   LMP  (LMP Unknown)   SpO2 98%   BMI 18.02 kg/m²         Physical Exam  Vitals and nursing note reviewed. Constitutional:       Appearance: Normal appearance.    HENT:      Head: Normoceph

## 2021-12-16 ENCOUNTER — HOSPITAL ENCOUNTER (OUTPATIENT)
Age: 66
Discharge: HOME | End: 2021-12-16
Payer: COMMERCIAL

## 2021-12-16 VITALS
DIASTOLIC BLOOD PRESSURE: 82 MMHG | OXYGEN SATURATION: 98 % | HEART RATE: 83 BPM | SYSTOLIC BLOOD PRESSURE: 127 MMHG | BODY MASS INDEX: 18 KG/M2 | WEIGHT: 150 LBS | TEMPERATURE: 98 F | RESPIRATION RATE: 18 BRPM

## 2021-12-16 DIAGNOSIS — Z20.822 ENCOUNTER FOR SCREENING LABORATORY TESTING FOR COVID-19 VIRUS: Primary | ICD-10-CM

## 2021-12-16 PROCEDURE — U0002 COVID-19 LAB TEST NON-CDC: HCPCS | Performed by: EMERGENCY MEDICINE

## 2021-12-16 PROCEDURE — 99212 OFFICE O/P EST SF 10 MIN: CPT | Performed by: NURSE PRACTITIONER

## 2021-12-16 NOTE — ED PROVIDER NOTES
Patient Seen in: [facility]      History   Patient presents with:  Testing    Stated Complaint: covid test    Subjective:   HPI    This well-appearing 28-year-old female who presents with Covid exposure. Covid exposure 1 week ago.   Asymptom air)       Current:/82   Pulse 83   Temp 98.3 °F (36.8 °C) (Temporal)   Resp 18   Wt 68 kg   LMP  (LMP Unknown)   SpO2 98%   BMI 18.02 kg/m²         Physical Exam  Vitals and nursing note reviewed. Constitutional:       General: She is awake.  She i to follow-up with primary care provider as needed    Disposition and Plan     Clinical Impression:  Encounter for screening laboratory testing for COVID-19 virus  (primary encounter diagnosis)     Disposition:  There is no disposition on file for this visi

## 2022-03-30 ENCOUNTER — HOSPITAL ENCOUNTER (OUTPATIENT)
Dept: MAMMOGRAPHY | Facility: HOSPITAL | Age: 67
Discharge: HOME | End: 2022-03-30
Attending: INTERNAL MEDICINE
Payer: MEDICARE

## 2022-03-30 DIAGNOSIS — Z17.0 MALIGNANT NEOPLASM OF UPPER-INNER QUADRANT OF RIGHT BREAST IN FEMALE, ESTROGEN RECEPTOR POSITIVE (HCC): ICD-10-CM

## 2022-03-30 DIAGNOSIS — C50.211 MALIGNANT NEOPLASM OF UPPER-INNER QUADRANT OF RIGHT BREAST IN FEMALE, ESTROGEN RECEPTOR POSITIVE (HCC): ICD-10-CM

## 2022-03-30 PROCEDURE — 77061 BREAST TOMOSYNTHESIS UNI: CPT | Performed by: INTERNAL MEDICINE

## 2022-03-30 PROCEDURE — 77065 DX MAMMO INCL CAD UNI: CPT | Performed by: INTERNAL MEDICINE

## 2022-03-31 ENCOUNTER — HOSPITAL ENCOUNTER (OUTPATIENT)
Age: 67
Discharge: HOME | End: 2022-03-31
Payer: MEDICARE

## 2022-03-31 VITALS
OXYGEN SATURATION: 100 % | TEMPERATURE: 99 F | HEART RATE: 88 BPM | DIASTOLIC BLOOD PRESSURE: 63 MMHG | RESPIRATION RATE: 18 BRPM | SYSTOLIC BLOOD PRESSURE: 123 MMHG

## 2022-03-31 DIAGNOSIS — Z20.822 ENCOUNTER FOR LABORATORY TESTING FOR COVID-19 VIRUS: Primary | ICD-10-CM

## 2022-03-31 LAB — SARS-COV-2 RNA RESP QL NAA+PROBE: NOT DETECTED

## 2022-03-31 PROCEDURE — U0002 COVID-19 LAB TEST NON-CDC: HCPCS | Performed by: EMERGENCY MEDICINE

## 2022-03-31 PROCEDURE — 99212 OFFICE O/P EST SF 10 MIN: CPT | Performed by: EMERGENCY MEDICINE

## 2022-03-31 NOTE — ED INITIAL ASSESSMENT (HPI)
Pt came in for a covid test for travel. Pt denies any symptoms at this moment. Pt has easy non labored respirations.

## 2022-04-13 ENCOUNTER — APPOINTMENT (OUTPATIENT)
Dept: HEMATOLOGY/ONCOLOGY | Facility: HOSPITAL | Age: 67
End: 2022-04-13
Payer: MEDICARE

## 2022-04-14 ENCOUNTER — APPOINTMENT (OUTPATIENT)
Dept: HEMATOLOGY/ONCOLOGY | Facility: HOSPITAL | Age: 67
End: 2022-04-14
Attending: NURSE PRACTITIONER
Payer: MEDICARE

## 2022-04-15 ENCOUNTER — OFFICE VISIT (OUTPATIENT)
Dept: HEMATOLOGY/ONCOLOGY | Facility: HOSPITAL | Age: 67
End: 2022-04-15
Attending: NURSE PRACTITIONER
Payer: MEDICARE

## 2022-04-15 VITALS
HEIGHT: 64 IN | WEIGHT: 154 LBS | RESPIRATION RATE: 16 BRPM | BODY MASS INDEX: 26.29 KG/M2 | TEMPERATURE: 98 F | SYSTOLIC BLOOD PRESSURE: 117 MMHG | HEART RATE: 92 BPM | DIASTOLIC BLOOD PRESSURE: 68 MMHG | OXYGEN SATURATION: 96 %

## 2022-04-15 DIAGNOSIS — Z17.0 MALIGNANT NEOPLASM OF UPPER-INNER QUADRANT OF RIGHT BREAST IN FEMALE, ESTROGEN RECEPTOR POSITIVE (HCC): Primary | ICD-10-CM

## 2022-04-15 DIAGNOSIS — Z51.81 ENCOUNTER FOR MONITORING AROMATASE INHIBITOR THERAPY: ICD-10-CM

## 2022-04-15 DIAGNOSIS — C50.211 MALIGNANT NEOPLASM OF UPPER-INNER QUADRANT OF RIGHT BREAST IN FEMALE, ESTROGEN RECEPTOR POSITIVE (HCC): Primary | ICD-10-CM

## 2022-04-15 DIAGNOSIS — Z79.811 ENCOUNTER FOR MONITORING AROMATASE INHIBITOR THERAPY: ICD-10-CM

## 2022-04-15 PROCEDURE — 99211 OFF/OP EST MAY X REQ PHY/QHP: CPT

## 2022-04-15 RX ORDER — ANASTROZOLE 1 MG/1
1 TABLET ORAL DAILY
Qty: 90 TABLET | Refills: 3 | Status: SHIPPED | OUTPATIENT
Start: 2022-04-15

## 2022-09-26 ENCOUNTER — APPOINTMENT (OUTPATIENT)
Dept: HEMATOLOGY/ONCOLOGY | Facility: HOSPITAL | Age: 67
End: 2022-09-26
Attending: INTERNAL MEDICINE
Payer: MEDICAID

## 2022-10-13 ENCOUNTER — APPOINTMENT (OUTPATIENT)
Dept: HEMATOLOGY/ONCOLOGY | Facility: HOSPITAL | Age: 67
End: 2022-10-13
Attending: INTERNAL MEDICINE
Payer: MEDICAID

## 2022-10-26 ENCOUNTER — HOSPITAL ENCOUNTER (OUTPATIENT)
Dept: ULTRASOUND IMAGING | Facility: HOSPITAL | Age: 67
Discharge: HOME | End: 2022-10-26
Attending: NURSE PRACTITIONER
Payer: MEDICARE

## 2022-10-26 ENCOUNTER — HOSPITAL ENCOUNTER (OUTPATIENT)
Dept: MAMMOGRAPHY | Facility: HOSPITAL | Age: 67
Discharge: HOME | End: 2022-10-26
Attending: NURSE PRACTITIONER
Payer: MEDICARE

## 2022-10-26 DIAGNOSIS — C50.211 MALIGNANT NEOPLASM OF UPPER-INNER QUADRANT OF RIGHT BREAST IN FEMALE, ESTROGEN RECEPTOR POSITIVE (HCC): ICD-10-CM

## 2022-10-26 DIAGNOSIS — Z17.0 MALIGNANT NEOPLASM OF UPPER-INNER QUADRANT OF RIGHT BREAST IN FEMALE, ESTROGEN RECEPTOR POSITIVE (HCC): ICD-10-CM

## 2022-10-26 PROCEDURE — 77062 BREAST TOMOSYNTHESIS BI: CPT | Performed by: NURSE PRACTITIONER

## 2022-10-26 PROCEDURE — 76642 ULTRASOUND BREAST LIMITED: CPT | Performed by: NURSE PRACTITIONER

## 2022-10-26 PROCEDURE — 77066 DX MAMMO INCL CAD BI: CPT | Performed by: NURSE PRACTITIONER

## 2022-10-28 ENCOUNTER — OFFICE VISIT (OUTPATIENT)
Dept: HEMATOLOGY/ONCOLOGY | Facility: HOSPITAL | Age: 67
End: 2022-10-28
Attending: INTERNAL MEDICINE
Payer: MEDICAID

## 2022-10-28 VITALS
OXYGEN SATURATION: 96 % | WEIGHT: 159 LBS | RESPIRATION RATE: 16 BRPM | TEMPERATURE: 98 F | HEIGHT: 64 IN | BODY MASS INDEX: 27.14 KG/M2 | DIASTOLIC BLOOD PRESSURE: 69 MMHG | SYSTOLIC BLOOD PRESSURE: 133 MMHG | HEART RATE: 80 BPM

## 2022-10-28 DIAGNOSIS — R92.8 ABNORMAL MAMMOGRAM: ICD-10-CM

## 2022-10-28 DIAGNOSIS — Z17.0 MALIGNANT NEOPLASM OF UPPER-INNER QUADRANT OF RIGHT BREAST IN FEMALE, ESTROGEN RECEPTOR POSITIVE (HCC): Primary | ICD-10-CM

## 2022-10-28 DIAGNOSIS — C50.211 MALIGNANT NEOPLASM OF UPPER-INNER QUADRANT OF RIGHT BREAST IN FEMALE, ESTROGEN RECEPTOR POSITIVE (HCC): Primary | ICD-10-CM

## 2022-10-28 DIAGNOSIS — Z78.0 POST-MENOPAUSAL: ICD-10-CM

## 2022-10-28 DIAGNOSIS — M85.80 OSTEOPENIA, UNSPECIFIED LOCATION: ICD-10-CM

## 2022-10-28 DIAGNOSIS — Z51.81 ENCOUNTER FOR MONITORING AROMATASE INHIBITOR THERAPY: ICD-10-CM

## 2022-10-28 DIAGNOSIS — Z79.811 ENCOUNTER FOR MONITORING AROMATASE INHIBITOR THERAPY: ICD-10-CM

## 2022-10-28 PROCEDURE — 99214 OFFICE O/P EST MOD 30 MIN: CPT | Performed by: INTERNAL MEDICINE

## 2023-04-27 ENCOUNTER — TELEPHONE (OUTPATIENT)
Dept: HEMATOLOGY/ONCOLOGY | Facility: HOSPITAL | Age: 68
End: 2023-04-27

## 2023-05-01 ENCOUNTER — TELEPHONE (OUTPATIENT)
Dept: HEMATOLOGY/ONCOLOGY | Facility: HOSPITAL | Age: 68
End: 2023-05-01

## 2023-05-01 ENCOUNTER — APPOINTMENT (OUTPATIENT)
Dept: HEMATOLOGY/ONCOLOGY | Facility: HOSPITAL | Age: 68
End: 2023-05-01
Attending: INTERNAL MEDICINE
Payer: MEDICAID

## 2023-05-01 NOTE — TELEPHONE ENCOUNTER
Esperanza Pearce called to reschedule her appt after her mammo I put her on this Friday at 1;30 only because she travels a lot . If you would like me to change it please let me know.  ángel

## 2023-05-03 ENCOUNTER — HOSPITAL ENCOUNTER (OUTPATIENT)
Dept: MAMMOGRAPHY | Facility: HOSPITAL | Age: 68
Discharge: HOME | End: 2023-05-03
Attending: INTERNAL MEDICINE
Payer: MEDICARE

## 2023-05-03 DIAGNOSIS — R92.8 ABNORMAL MAMMOGRAM: ICD-10-CM

## 2023-05-03 DIAGNOSIS — C50.211 MALIGNANT NEOPLASM OF UPPER-INNER QUADRANT OF RIGHT BREAST IN FEMALE, ESTROGEN RECEPTOR POSITIVE (HCC): ICD-10-CM

## 2023-05-03 DIAGNOSIS — Z17.0 MALIGNANT NEOPLASM OF UPPER-INNER QUADRANT OF RIGHT BREAST IN FEMALE, ESTROGEN RECEPTOR POSITIVE (HCC): ICD-10-CM

## 2023-05-03 PROCEDURE — 77062 BREAST TOMOSYNTHESIS BI: CPT | Performed by: INTERNAL MEDICINE

## 2023-05-03 PROCEDURE — 77066 DX MAMMO INCL CAD BI: CPT | Performed by: INTERNAL MEDICINE

## 2023-05-05 ENCOUNTER — APPOINTMENT (OUTPATIENT)
Dept: HEMATOLOGY/ONCOLOGY | Facility: HOSPITAL | Age: 68
End: 2023-05-05
Attending: INTERNAL MEDICINE
Payer: MEDICAID

## 2023-05-08 NOTE — DISCHARGE INSTRUCTIONS
The Doctor (Radiologist) who performed your procedure was: DR Juan Manuel Casas Dr an ice pack over the biopsy site on top of your bra or on top of the ACE wrap (never apply ice directly over skin) for 10-15 minutes of every hour until bedtime for your comfort and to decrease bleeding. Keep your sports bra or the ACE wrap (stereotactic and MRI biopsy) in place for 24 hours after your biopsy. This compression decreases bleeding and breast movement for your comfort. Wear a supportive bra for the next couple of days for comfort (sports bra for sleep). Continue to wear, preferably, a sports bra or good supportive bra for 1 week and take off only to shower. No baths or showers during the first 24 hours after biopsy. After this time you may take a shower. It's okay if the strips get wet but do not soak them. NO saunas, hot tubs or swimming until steri-strips fall off (approx. 5 days). This prevents infection and allows time for them to completely close and heal.  DO NOT remove the steri-strips. They will fall off in 5 days. If any type of irritation (redness, itching or blisters) develops in the area around the steri-strips, remove them gently. If the steri-strips do not fall off after 5 days, gently remove them. Keep the area clean and dry. It is normal to have mild discomfort and bruising at the biopsy site. You may take Tylenol as needed for discomfort, as long as you have no allergies to Tylenol. Do not take aspirin, motrin, ibuprofen or any medication containing NSAID (non-steroidal anti-inflammatory drug) product for 48 hours. DO NOT participate in strenuous activity (aerobics, heavy lifting, housework, gardening, etc.) 48 hours after your biopsy to prevent bleeding. You will receive results in 2-3 business days. If you are having an MRI breast biopsy or an Ultrasound guided breast biopsy, you will be billed for the biopsy and unilateral mammogram separately.   If you have any questions about the procedure or your results, please contact the Breast Care Coordinator Nurse at [phone]. Notify your ordering physician or primary physician for increased bleeding, pain or fever over 100. Or contact a Radiology Nurse at [phone] between 8am-4pm (after 4pm, your call will be directed to the [place] [address] Emergency Room).

## 2023-05-09 ENCOUNTER — HOSPITAL ENCOUNTER (OUTPATIENT)
Dept: MAMMOGRAPHY | Facility: HOSPITAL | Age: 68
Discharge: HOME | End: 2023-05-09
Attending: INTERNAL MEDICINE
Payer: MEDICARE

## 2023-05-09 VITALS — SYSTOLIC BLOOD PRESSURE: 130 MMHG | DIASTOLIC BLOOD PRESSURE: 81 MMHG | HEART RATE: 84 BPM | RESPIRATION RATE: 16 BRPM

## 2023-05-09 DIAGNOSIS — R92.1 BREAST CALCIFICATION, RIGHT: ICD-10-CM

## 2023-05-09 PROCEDURE — 19081 BX BREAST 1ST LESION STRTCTC: CPT | Performed by: INTERNAL MEDICINE

## 2023-05-09 PROCEDURE — 88305 TISSUE EXAM BY PATHOLOGIST: CPT | Performed by: INTERNAL MEDICINE

## 2023-05-09 NOTE — PROCEDURES
[facility]OSP - [facility]  Procedure Note    Norval Mary Patient Status:  Outpatient    1955 MRN [id number]   Location [address] [place] [address] Attending Vannessa Polanco MD   Hosp Day # 0 PCP CRISTOBAL BULL     Procedure: Right breast stereotactic/bri guided biopsy    Pre-Procedure Diagnosis:  Right breast calcifications    Post-Procedure Diagnosis: Right breast calcifications    Anesthesia:  Local    Findings:  Right breast calcifications    Specimens: multiple cores    Blood Loss:  minimal    Tourniquet Time: none  Complications:  None  Drains:  none      Roebl Kenny MD  2023

## 2023-05-09 NOTE — IMAGING NOTE
11 am Was called to check on Edelmira Mayo post Bx developed increased bleeding pressure being held by [facility] staff . Upon arrival [address] holding firm pressure to right  Breast rt breast was  was in compression . Joeyheld pressure for half hour so far . Compression was released are remains acitvely  bleeding  Manual  compression with ice added to rt breast by this Rn. Pt lying back color pink rt breast with bruising noted  Verbalizes to staff forgot to mentionthat her dtr gave her turkey mushroom supplement to take and she had taken it the last 4 days instructed her not to take any more. 1115  manual compression continues area remains oozing blood continuously Dr Alton Dunlap here vss bp 130/84 7618     1125  Bleeding stopped area cleaned stere strips to sites  Juice and shortbread cookies provided     1128 area starts bleeding again bled through steri strips  Firm compression manually by this Rn and Alex pt c/o\" feeling nauseous and little dizzy\" chair placed back feet held by [address] begins to answer questions slowly  We continue to talk to pt pt responding to questions stated she \"never passed out before\" . Verbalizes she became worried that area wouldn't stop bleeding and got  Really nervous all of a sudden\" emotional support given     1130 vss 138/81 80 18 Dr Alton Dunlap here bleeding  Continues manual compression continues ice continues     1138 bleeding has stopped manual compressionreleased  Area cleaned new steri strips applied sat pt up slowly no c/o no sob color noted vss pulse 84 16 resp 145/7716 steristrip d/i       1145 Post mammogram to be done     1150 Dr Alton Dunlap informed no further bleeding noted pt to be discharged per Dr Alton Dunlap    1155 am mammogram completed no active bleeding noted steri strips to site intact folded 4x4 placed over site secured with paper tape.  Bra applied per pt ace wrap secured by Derrill Shown instruction reviewed informed pt if blood bled through her ace wrap dsg to hold pressure to site immediately and go to er for reevaluation. Christin Reyes verbalizes  Understanding and agreement     12pm discharged ambulatory to spouse      1710 follow up call no bleeding noted through dressing per Judeth Squibb Shahbaz\"doing well icing and resting a hoda \"

## 2023-05-10 ENCOUNTER — TELEPHONE (OUTPATIENT)
Dept: MAMMOGRAPHY | Facility: HOSPITAL | Age: 68
End: 2023-05-10

## 2023-05-12 ENCOUNTER — OFFICE VISIT (OUTPATIENT)
Dept: HEMATOLOGY/ONCOLOGY | Facility: HOSPITAL | Age: 68
End: 2023-05-12
Attending: INTERNAL MEDICINE
Payer: MEDICARE

## 2023-05-12 VITALS
HEART RATE: 93 BPM | RESPIRATION RATE: 16 BRPM | HEIGHT: 64 IN | OXYGEN SATURATION: 96 % | DIASTOLIC BLOOD PRESSURE: 61 MMHG | WEIGHT: 154.81 LBS | SYSTOLIC BLOOD PRESSURE: 120 MMHG | TEMPERATURE: 98 F | BODY MASS INDEX: 26.43 KG/M2

## 2023-05-12 DIAGNOSIS — Z17.0 MALIGNANT NEOPLASM OF UPPER-INNER QUADRANT OF RIGHT BREAST IN FEMALE, ESTROGEN RECEPTOR POSITIVE (HCC): Primary | ICD-10-CM

## 2023-05-12 DIAGNOSIS — Z51.81 ENCOUNTER FOR MONITORING AROMATASE INHIBITOR THERAPY: ICD-10-CM

## 2023-05-12 DIAGNOSIS — C50.211 MALIGNANT NEOPLASM OF UPPER-INNER QUADRANT OF RIGHT BREAST IN FEMALE, ESTROGEN RECEPTOR POSITIVE (HCC): Primary | ICD-10-CM

## 2023-05-12 DIAGNOSIS — Z85.3 ENCOUNTER FOR FOLLOW-UP SURVEILLANCE OF BREAST CANCER: ICD-10-CM

## 2023-05-12 DIAGNOSIS — Z08 ENCOUNTER FOR FOLLOW-UP SURVEILLANCE OF BREAST CANCER: ICD-10-CM

## 2023-05-12 DIAGNOSIS — Z79.811 ENCOUNTER FOR MONITORING AROMATASE INHIBITOR THERAPY: ICD-10-CM

## 2023-05-12 PROCEDURE — 99214 OFFICE O/P EST MOD 30 MIN: CPT | Performed by: INTERNAL MEDICINE

## 2023-05-12 RX ORDER — ANASTROZOLE 1 MG/1
1 TABLET ORAL DAILY
Qty: 90 TABLET | Refills: 3 | Status: SHIPPED | OUTPATIENT
Start: 2023-05-12

## 2023-10-02 ENCOUNTER — HOSPITAL ENCOUNTER (OUTPATIENT)
Dept: BONE DENSITY | Age: 68
Discharge: HOME | End: 2023-10-02
Attending: INTERNAL MEDICINE
Payer: MEDICARE

## 2023-10-02 DIAGNOSIS — Z78.0 POST-MENOPAUSAL: ICD-10-CM

## 2023-10-02 DIAGNOSIS — M85.80 OSTEOPENIA, UNSPECIFIED LOCATION: ICD-10-CM

## 2023-10-02 PROCEDURE — 77080 DXA BONE DENSITY AXIAL: CPT | Performed by: INTERNAL MEDICINE

## 2023-10-06 ENCOUNTER — HOSPITAL ENCOUNTER (OUTPATIENT)
Dept: MAMMOGRAPHY | Facility: HOSPITAL | Age: 68
Discharge: HOME | End: 2023-10-06
Attending: INTERNAL MEDICINE
Payer: MEDICARE

## 2023-10-06 DIAGNOSIS — C50.211 MALIGNANT NEOPLASM OF UPPER-INNER QUADRANT OF RIGHT BREAST IN FEMALE, ESTROGEN RECEPTOR POSITIVE: ICD-10-CM

## 2023-10-06 DIAGNOSIS — Z17.0 MALIGNANT NEOPLASM OF UPPER-INNER QUADRANT OF RIGHT BREAST IN FEMALE, ESTROGEN RECEPTOR POSITIVE: ICD-10-CM

## 2023-10-06 PROCEDURE — 77061 BREAST TOMOSYNTHESIS UNI: CPT | Performed by: INTERNAL MEDICINE

## 2023-10-06 PROCEDURE — 77065 DX MAMMO INCL CAD UNI: CPT | Performed by: INTERNAL MEDICINE

## 2023-10-19 ENCOUNTER — OFFICE VISIT (OUTPATIENT)
Dept: OTOLARYNGOLOGY | Facility: CLINIC | Age: 68
End: 2023-10-19
Payer: MEDICARE

## 2023-10-19 VITALS — BODY MASS INDEX: 25.83 KG/M2 | HEIGHT: 65 IN | WEIGHT: 155 LBS

## 2023-10-19 DIAGNOSIS — G47.33 OBSTRUCTIVE SLEEP APNEA: ICD-10-CM

## 2023-10-19 DIAGNOSIS — J34.2 NASAL SEPTAL DEVIATION: Primary | ICD-10-CM

## 2023-10-19 DIAGNOSIS — J34.3 NASAL TURBINATE HYPERTROPHY: ICD-10-CM

## 2023-10-19 RX ORDER — DEXAMETHASONE 4 MG/1
4 TABLET ORAL DAILY
COMMUNITY
Start: 2023-10-14

## 2023-10-19 RX ORDER — BUPROPION HYDROCHLORIDE 100 MG/1
100 TABLET, EXTENDED RELEASE ORAL DAILY
COMMUNITY
Start: 2023-09-09

## 2023-10-19 RX ORDER — FLUTICASONE PROPIONATE 50 MCG
1 SPRAY, SUSPENSION (ML) NASAL 2 TIMES DAILY
Qty: 16 G | Refills: 5 | Status: SHIPPED | OUTPATIENT
Start: 2023-10-19

## 2023-10-19 RX ORDER — AZELASTINE 1 MG/ML
2 SPRAY, METERED NASAL 2 TIMES DAILY
Qty: 30 ML | Refills: 5 | Status: SHIPPED | OUTPATIENT
Start: 2023-10-19

## 2023-10-19 RX ORDER — ERGOCALCIFEROL 1.25 MG/1
50000 CAPSULE ORAL WEEKLY
COMMUNITY
Start: 2023-10-15

## 2023-10-19 RX ORDER — ALENDRONATE SODIUM 70 MG/1
70 TABLET ORAL WEEKLY
COMMUNITY
Start: 2023-10-15

## 2023-10-20 NOTE — PATIENT INSTRUCTIONS
I placed you on a trial of Astelin and Flonase nasal sprays for your nasal congestion. You do have a septal deviation left greater than right  I ordered a home sleep study to evaluate you for sleep apnea. I will of course notify you of all results. Follow-up in 3 weeks time, sooner if problems.

## 2023-11-21 ENCOUNTER — OFFICE VISIT (OUTPATIENT)
Dept: HEMATOLOGY/ONCOLOGY | Facility: HOSPITAL | Age: 68
End: 2023-11-21
Attending: INTERNAL MEDICINE
Payer: MEDICARE

## 2023-11-21 VITALS
SYSTOLIC BLOOD PRESSURE: 135 MMHG | HEIGHT: 64 IN | WEIGHT: 154 LBS | HEART RATE: 69 BPM | OXYGEN SATURATION: 96 % | DIASTOLIC BLOOD PRESSURE: 74 MMHG | TEMPERATURE: 98 F | BODY MASS INDEX: 26.29 KG/M2 | RESPIRATION RATE: 16 BRPM

## 2023-11-21 DIAGNOSIS — Z17.0 MALIGNANT NEOPLASM OF UPPER-INNER QUADRANT OF RIGHT BREAST IN FEMALE, ESTROGEN RECEPTOR POSITIVE: Primary | ICD-10-CM

## 2023-11-21 DIAGNOSIS — Z12.31 SCREENING MAMMOGRAM FOR BREAST CANCER: ICD-10-CM

## 2023-11-21 DIAGNOSIS — Z51.81 ENCOUNTER FOR MONITORING AROMATASE INHIBITOR THERAPY: ICD-10-CM

## 2023-11-21 DIAGNOSIS — Z79.811 ENCOUNTER FOR MONITORING AROMATASE INHIBITOR THERAPY: ICD-10-CM

## 2023-11-21 DIAGNOSIS — Z78.0 POST-MENOPAUSAL: ICD-10-CM

## 2023-11-21 DIAGNOSIS — Z85.3 ENCOUNTER FOR FOLLOW-UP SURVEILLANCE OF BREAST CANCER: ICD-10-CM

## 2023-11-21 DIAGNOSIS — Z08 ENCOUNTER FOR FOLLOW-UP SURVEILLANCE OF BREAST CANCER: ICD-10-CM

## 2023-11-21 DIAGNOSIS — C50.211 MALIGNANT NEOPLASM OF UPPER-INNER QUADRANT OF RIGHT BREAST IN FEMALE, ESTROGEN RECEPTOR POSITIVE: Primary | ICD-10-CM

## 2023-11-21 PROCEDURE — 99214 OFFICE O/P EST MOD 30 MIN: CPT | Performed by: INTERNAL MEDICINE

## 2023-11-21 RX ORDER — LETROZOLE 2.5 MG/1
2.5 TABLET, FILM COATED ORAL DAILY
Qty: 30 TABLET | Refills: 6 | Status: SHIPPED | OUTPATIENT
Start: 2023-11-21

## 2023-11-22 ENCOUNTER — OFFICE VISIT (OUTPATIENT)
Dept: PODIATRY CLINIC | Facility: CLINIC | Age: 68
End: 2023-11-22
Payer: MEDICARE

## 2023-11-22 VITALS — SYSTOLIC BLOOD PRESSURE: 130 MMHG | DIASTOLIC BLOOD PRESSURE: 76 MMHG

## 2023-11-22 DIAGNOSIS — G57.82 NEUROMA OF THIRD INTERSPACE OF LEFT FOOT: Primary | ICD-10-CM

## 2023-11-22 DIAGNOSIS — G57.62 MORTON'S METATARSALGIA, NEURALGIA, OR NEUROMA, LEFT: ICD-10-CM

## 2023-11-22 PROCEDURE — 3078F DIAST BP <80 MM HG: CPT | Performed by: PODIATRIST

## 2023-11-22 PROCEDURE — 64455 NJX AA&/STRD PLTR COM DG NRV: CPT | Performed by: PODIATRIST

## 2023-11-22 PROCEDURE — 1159F MED LIST DOCD IN RCRD: CPT | Performed by: PODIATRIST

## 2023-11-22 PROCEDURE — 3075F SYST BP GE 130 - 139MM HG: CPT | Performed by: PODIATRIST

## 2023-11-22 PROCEDURE — 1126F AMNT PAIN NOTED NONE PRSNT: CPT | Performed by: PODIATRIST

## 2023-11-22 RX ORDER — TRIAMCINOLONE ACETONIDE 40 MG/ML
20 INJECTION, SUSPENSION INTRA-ARTICULAR; INTRAMUSCULAR ONCE
Status: COMPLETED | OUTPATIENT
Start: 2023-11-22 | End: 2023-11-22

## 2023-11-22 NOTE — PROGRESS NOTES
Per Dr. Jose Alberto Pacheco draw up 0.5ml of 0.5% Marcaine and 0.5ml of Kenalog 40 for injection to left foot.

## 2023-12-03 NOTE — PROGRESS NOTES
Mikhail Vigil is a 80year old female. Chief Complaint   Patient presents with    Foot Pain     Left foot- cannot wear closed shoes- irritated- patient denies pain at this time- painful when walking          HPI:   Patient presents to the clinic with a chief complaint of left foot pain she cannot wear enclosed shoes it is irritated she denies pain when off weightbearing but this occurs mostly with weightbearing and only when wearing enclosed shoes. At today's visit reviewed nurse's history as taken above, allergies medications and medical history as documented below. All changes duly noted  Allergies: Penicillins   Current Outpatient Medications   Medication Sig Dispense Refill    letrozole 2.5 MG Oral Tab Take 1 tablet (2.5 mg total) by mouth daily. 30 tablet 6    alendronate 70 MG Oral Tab Take 1 tablet (70 mg total) by mouth once a week. buPROPion  MG Oral Tablet 12 Hr Take 1 tablet (100 mg total) by mouth daily. dexamethasone (DECADRON) 4 MG tablet Take 1 tablet (4 mg total) by mouth daily. ergocalciferol 1.25 MG (07627 UT) Oral Cap Take 1 capsule (50,000 Units total) by mouth once a week. azelastine 0.1 % Nasal Solution 2 sprays by Nasal route 2 (two) times daily. 30 mL 5    fluticasone propionate 50 MCG/ACT Nasal Suspension 1 spray by Nasal route 2 (two) times daily. 16 g 5    Multiple Vitamin (MULTI-VITAMIN DAILY) Oral Tab Take by mouth. Pyridoxine HCl (VITAMIN B-6 OR) Take by mouth. Cholecalciferol (VITAMIN D3) 75 MCG (3000 UT) Oral Tab Take by mouth daily as needed. Calcium Carbonate (CALCIUM 500 OR) Take 1 tablet by mouth daily. EPINEPHrine 0.3 MG/0.3ML Injection Solution Auto-injector Inject 0.3 mL (1 each total) into the skin. valACYclovir HCl 500 MG Oral Tab Take 2 tablets (1,000 mg total) by mouth daily.  60 tablet 0      Past Medical History:   Diagnosis Date    Anxiety state     Back problem     stenosis    Breast cancer ([facility] [address] 02/16/2021    Cancer [facility])     Colon polyp 5-    Hemorrhoids     external    Muscle weakness     right leg r/t back issues    Sleep apnea     never diagnosed but pt states snores loud and wakes often    Visual impairment       Past Surgical History:   Procedure Laterality Date    APPENDECTOMY      COLONOSCOPY  2017    They found polyps.  My aunt  of colon cancer at 64    COLONOSCOPY N/A 2021    Procedure: COLONOSCOPY;  Surgeon: Didier Alvarado MD;  Location: [facility] ENDO    LUMPECTOMY RIGHT  2021    LISA BIOPSY STEREO NODULE 1 SITE RIGHT (CPT=19081)  2023    NEEDLE BIOPSY RIGHT      2020          OTHER      discectomy lumbar    RADIATION RIGHT      SPINE SURGERY PROCEDURE UNLISTED      herniated disc at [facility]       Family History   Problem Relation Age of Onset    Heart Disorder Father     Stroke Father     Breast Cancer Mother 46    Cancer Mother         Breast cancer 1973 -  2006    Heart Disorder Brother     Breast Cancer Maternal Cousin Female 48    Breast Cancer Self 59    Pancreatic Cancer Maternal Aunt 62      Social History     Socioeconomic History    Marital status:    Tobacco Use    Smoking status: Former     Packs/day: 0.50     Years: 5.00     Additional pack years: 0.00     Total pack years: 2.50     Types: Cigarettes     Quit date: 1984     Years since quittin.4    Smokeless tobacco: Never    Tobacco comments:     very rare-socially   Vaping Use    Vaping Use: Never used   Substance and Sexual Activity    Alcohol use: Yes     Comment: 1 glass of wine a week    Drug use: No           REVIEW OF SYSTEMS:   Today reviewed systens as documented below  GENERAL HEALTH: feels well otherwise  SKIN: Refer to exam below  RESPIRATORY: denies shortness of breath with exertion  CARDIOVASCULAR: denies chest pain on exertion  GI: denies abdominal pain and denies heartburn  NEURO: denies headaches    EXAM:   /76 (BP Location: Right arm, Patient Position: Sitting, Cuff Size: adult)   LMP  (LMP Unknown)   GENERAL: well developed, well nourished, in no apparent distress  EXTREMITIES:   1. Integument: The skin on the left foot is warm and dry there are no defects noted nails have normal thickness and appearance   2. Vascular: Patient has palpable pulses dorsalis pedis and posterior tibial   3. Neurologic: Has intact sensorium there is no deficits noted   4. Musculoskeletal: Patient has good muscle strength she has a positive Shannan's test in the third intermetatarsal space of her left foot. 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 The patient was consented for and after timeout was taken a cortisone injection with peripheral nerve block was administered into the third interspace of the left foot. This was accomplished utilizing 20 mg of Kenalog and 0.5 cc of 0.5% Marcaine plain as a peripheral nerve block and a Band-Aid was applied to this injection site   Follow-up as needed if this continues to occur then may recommend excision  The patient indicates understanding of these issues and agrees to the plan.     Bharat Almendarez DPM

## 2024-02-06 ENCOUNTER — TELEPHONE (OUTPATIENT)
Facility: CLINIC | Age: 69
End: 2024-02-06

## 2024-02-06 NOTE — TELEPHONE ENCOUNTER
Patient outreach message received:    Recall CLN in 3 years per Dr. Keyes. Last CLN done 05/05/2021. Recall entered into Patient Outreach for 05/05/2024.     Recall reminder letter mailed out to patient.

## 2024-02-23 ENCOUNTER — TELEPHONE (OUTPATIENT)
Facility: CLINIC | Age: 69
End: 2024-02-23

## 2024-02-23 DIAGNOSIS — Z86.010 HX OF COLONIC POLYPS: Primary | ICD-10-CM

## 2024-03-01 NOTE — TELEPHONE ENCOUNTER
Dr. Keyes,   Please review recall and give orders when able.  Thanks,  Mei Amos Procedure, Date, MD:  2021 Colonoscopy Dr. Keyes  Last Diagnosis:  SSA, HP  Recalled (mth/yrs): 3 years  Sedation Used Previously:  MAC  Last Prep Used (if known): suprep   Quality Of Prep (if known): good with washing  Anticoagulants: No  Diuretics: No  Diabetic Med's (PO/Injectables): No  Weight loss Med's: No  Iron/Herbal/Multivitamin Supplements (RX/OTC): yes  Marijuana/Vaping/CBD: No  Height & Weight: 5'4\"/154lbs  BMI: 26.42  Hx of Cardiac/CVA Issues/(MI/Stroke): No  Devices Pacemaker/Defibrillator/Stents: No  Respiratory Issues/Oxygen Use/EULALIA/COPD: No but pt snores  Issues w/ Anesthesia: No    Symptoms (Y/N): No  Symptoms Details: No    Special Comments/Notes: Pt prefers to be called after 4 pm if possible.    Please advise on orders and prep. Meds, allergies, and pharmacy verified with pt.    Thank you!      2021 10:40 AM COLONOSCOPY    Andrew Keyes MD               Signed         COLONOSCOPY REPORT           Ирина Hartmann      1955 Age 65 year old   PCP No primary care provider on file. Endoscopist Andrew Keyes MD      Date of procedure: 21     Procedure: Colonoscopy w/ cold snare and cold biopsy polypectomy     Pre-operative diagnosis: screening     Post-operative diagnosis: polyps, diverticulosis and hemorrhoids     Medications: MAC sedation  Withdrawal time: 18 minutes     Procedure:  Informed consent was obtained from the patient after the risks of the procedure were discussed, including but not limited to bleeding, perforation, aspiration, infection, or possibility of a missed lesion. After discussions of the risks/benefits and alternatives to this procedure, as well as the planned sedation, the patient was placed in the left lateral decubitus position and begun on continuous blood pressure pulse oximetry and EKG monitoring and this was maintained throughout the procedure. Once an adequate level of  sedation was obtained a digital rectal exam was completed. Then the lubricated tip of the [id number] diagnostic video colonoscope was inserted and advanced without difficulty to the cecum using the CO2 insufflation technique. The cecum was identified by localizing the trifold, the appendix and the ileocecal valve. Withdrawal was begun with thorough washing and careful examination of the colonic walls and folds. A routine second examination of the cecum/ascending colon was performed. Photodocumentation was obtained. The bowel prep was good. Views of the colon were good with washing. I then carefully withdrew the instrument from the patient who tolerated the procedure well.      Complications: none.     Findings:   1. 5 polyp(s) noted as follows:      A. 3-5 mm polyps x3 in the ascending colon; flat morphology; cold biopsy smaller polyp and larger polyp removed with cold snare polypectomy and retrieved.      B. 7 mm polyp in the transverse colon; flat morphology; cold snare polypectomy and retrieved.      C. **3 mm polyp in the rectal colon; flat morphology; cold biopsy polypectomy and retrieved.     2. Diverticulosis: left sided.     3. Terminal ileum: the visualized mucosa appeared normal.     4. A retroflexed view of the rectum revealed hemorrhoids.     5. The colonic mucosa throughout the colon showed normal vascular pattern, without evidence of angioectasias or inflammation.      6. JESSICA: normal rectal tone, no masses palpated.      Recommend:  Await pathology. The interval for the next colonoscopy will be determined after reviewing pathology, 3-5 years. If new signs or symptoms develop, colonoscopy may need to be repeated sooner.   High fiber diet.  Monitor for blood in the stool. If having more than just tinge of blood, call office or go to the ER.     >>>If tissue was obtained and you have not received your pathology results either by phone or letter within 2 weeks, please call our office at  [phone].     Specimens: ascending polyps, transverse polyp and rectal polyp                  Electronically signed by Andrew Keyes MD at 5/5/2021 11:40 AM  Final Diagnosis:      A. Ascending colon polyps x3; biopsy:  Sessile serrated adenoma x1     B. Transverse colon polyp x1; biopsy:  Fragments of sessile serrated adenoma     C. Rectum polyp x1; biopsy:  Hyperplastic polyp

## 2024-03-05 RX ORDER — SODIUM, POTASSIUM,MAG SULFATES 17.5-3.13G
SOLUTION, RECONSTITUTED, ORAL ORAL
Qty: 1 EACH | Refills: 0 | Status: SHIPPED | OUTPATIENT
Start: 2024-03-05

## 2024-03-05 NOTE — TELEPHONE ENCOUNTER
1. Schedule colonoscopy with MAC [Diagnosis: history of polyps]    2.  bowel prep from pharmacy (split suprep or trilyte)    3. Continue all medications for procedure except      ** If MAC @ [facility]/NE:    - NO alcohol, recreational drugs nor erectile dysfunction mediations 72 hours before procedure(s)   - NO herbal supplements or weight loss medications x 7 days prior to the procedure(s)    ** [facility] continue all medications as prescribed    4. Read all bowel prep instructions carefully    5. AVOID seeds, nuts, popcorn, raw fruits and vegetables (cooked is okay) for 7 days before procedure        >>>Please note: if you were prescribed Suprep for the bowel prep and it is too expensive or not covered by insurance, it is okay to substitute Trilyte (or any similar generic prep). This can be done by notifying the pharmacy or calling our office.

## 2024-03-15 NOTE — TELEPHONE ENCOUNTER
Scheduled for:  Colonoscopy [id number]   Provider Name:  Dr. Keyes   Date:  6/27/2024  Location:    [facility]  Sedation:  Mac  Time:  2:00 (patient is aware arrival time is 1:00)  Prep:  suprep  Meds/Allergies Reconciled?:  Physician reviewed   Diagnosis with codes:  hx of colon polyps Z86.010  Was patient informed to call insurance with codes (Y/N):  Yes, I confirmed [employer] medicare insurance with the patient.   Referral sent?:  Referral was sent at the time of electronic surgical scheduling.  EM or [facility] notified?:  I sent an electronic request to Endo Scheduling and received a confirmation today.   Medication Orders:  This patient verbally confirmed that she is not taking:   Iron, blood thinners, BP meds, and is not diabetic   Not latex allergy, Not PCN allergy and does not have a pacemaker  Misc Orders:  I discussed the prep instructions with the patient which she verbally understood and is aware that I will mychart the instructions today.  Further instructions given by staff:

## 2024-05-20 ENCOUNTER — HOSPITAL ENCOUNTER (OUTPATIENT)
Dept: MAMMOGRAPHY | Facility: HOSPITAL | Age: 69
Discharge: HOME | End: 2024-05-20
Attending: INTERNAL MEDICINE

## 2024-05-20 DIAGNOSIS — Z12.31 SCREENING MAMMOGRAM FOR BREAST CANCER: ICD-10-CM

## 2024-05-20 PROCEDURE — 77067 SCR MAMMO BI INCL CAD: CPT | Performed by: INTERNAL MEDICINE

## 2024-05-20 PROCEDURE — 77063 BREAST TOMOSYNTHESIS BI: CPT | Performed by: INTERNAL MEDICINE

## 2024-05-21 ENCOUNTER — OFFICE VISIT (OUTPATIENT)
Dept: HEMATOLOGY/ONCOLOGY | Facility: HOSPITAL | Age: 69
End: 2024-05-21
Attending: INTERNAL MEDICINE

## 2024-05-21 VITALS
HEART RATE: 88 BPM | WEIGHT: 164.38 LBS | HEIGHT: 64 IN | DIASTOLIC BLOOD PRESSURE: 70 MMHG | OXYGEN SATURATION: 97 % | SYSTOLIC BLOOD PRESSURE: 124 MMHG | RESPIRATION RATE: 16 BRPM | TEMPERATURE: 98 F | BODY MASS INDEX: 28.06 KG/M2

## 2024-05-21 DIAGNOSIS — Z17.0 MALIGNANT NEOPLASM OF UPPER-INNER QUADRANT OF RIGHT BREAST IN FEMALE, ESTROGEN RECEPTOR POSITIVE (HCC): Primary | ICD-10-CM

## 2024-05-21 DIAGNOSIS — Z12.31 SCREENING MAMMOGRAM FOR BREAST CANCER: ICD-10-CM

## 2024-05-21 DIAGNOSIS — C50.211 MALIGNANT NEOPLASM OF UPPER-INNER QUADRANT OF RIGHT BREAST IN FEMALE, ESTROGEN RECEPTOR POSITIVE (HCC): Primary | ICD-10-CM

## 2024-05-21 DIAGNOSIS — M85.80 OSTEOPENIA, UNSPECIFIED LOCATION: ICD-10-CM

## 2024-05-21 DIAGNOSIS — Z78.0 POST-MENOPAUSAL: ICD-10-CM

## 2024-05-21 DIAGNOSIS — Z51.81 ENCOUNTER FOR MONITORING AROMATASE INHIBITOR THERAPY: ICD-10-CM

## 2024-05-21 DIAGNOSIS — Z08 ENCOUNTER FOR FOLLOW-UP SURVEILLANCE OF BREAST CANCER: ICD-10-CM

## 2024-05-21 DIAGNOSIS — Z85.3 ENCOUNTER FOR FOLLOW-UP SURVEILLANCE OF BREAST CANCER: ICD-10-CM

## 2024-05-21 DIAGNOSIS — Z79.811 ENCOUNTER FOR MONITORING AROMATASE INHIBITOR THERAPY: ICD-10-CM

## 2024-05-21 PROCEDURE — 99214 OFFICE O/P EST MOD 30 MIN: CPT | Performed by: INTERNAL MEDICINE

## 2024-05-21 RX ORDER — LETROZOLE 2.5 MG/1
2.5 TABLET, FILM COATED ORAL DAILY
Qty: 90 TABLET | Refills: 3 | Status: SHIPPED | OUTPATIENT
Start: 2024-05-21

## 2024-05-21 NOTE — PROGRESS NOTES
HPI     Ирина Hartmann is a 69 year old female here for follow up of diagnosis of   Encounter Diagnoses   Name Primary?    Malignant neoplasm of upper-inner quadrant of right breast in female, estrogen receptor positive (HCC) Yes    Encounter for monitoring aromatase inhibitor therapy     Encounter for follow-up surveillance of breast cancer     Post-menopausal     Screening mammogram for breast cancer     Osteopenia, unspecified location         Feeling better now.  Has gained 10 lbs, hard time losing weight despite not eating much.      States more sore on the R breast.      Taking anastrozole and now started on letrozole.  States joint aches a little better with letrozole and states that when she is active or exercise helps the joint.     Mild hot flashes at night.       ECOG PS 0      Review of Systems:   Review of Systems   Constitutional:  Negative for appetite change, fatigue and unexpected weight change.   Respiratory:  Positive for wheezing (states thinks from allergies exposed to.). Negative for cough and shortness of breath.    Cardiovascular:  Negative for chest pain.   Gastrointestinal:  Negative for abdominal pain.   Endocrine: Positive for hot flashes.   Genitourinary:  Negative for dysuria and frequency.    Musculoskeletal:  Positive for arthralgias (with AI). Negative for back pain and neck pain.        No bone pain   Neurological:  Negative for dizziness and headaches.   Hematological:  Negative for adenopathy.   Psychiatric/Behavioral:  Positive for sleep disturbance.            Current Outpatient Medications   Medication Sig Dispense Refill    Na Sulfate-K Sulfate-Mg Sulf (SUPREP BOWEL PREP KIT) 17.5-3.13-1.6 GM/177ML Oral Solution As directed. 1 each 0    letrozole 2.5 MG Oral Tab Take 1 tablet (2.5 mg total) by mouth daily. 30 tablet 6    alendronate 70 MG Oral Tab Take 1 tablet (70 mg total) by mouth once a week.      ergocalciferol 1.25 MG (60106 UT) Oral Cap Take 1 capsule (50,000 Units  total) by mouth once a week.      azelastine 0.1 % Nasal Solution 2 sprays by Nasal route 2 (two) times daily. 30 mL 5    fluticasone propionate 50 MCG/ACT Nasal Suspension 1 spray by Nasal route 2 (two) times daily. 16 g 5    Multiple Vitamin (MULTI-VITAMIN DAILY) Oral Tab Take by mouth.      Pyridoxine HCl (VITAMIN B-6 OR) Take by mouth.      Cholecalciferol (VITAMIN D3) 75 MCG (3000 UT) Oral Tab Take by mouth daily as needed.      Calcium Carbonate (CALCIUM 500 OR) Take 1 tablet by mouth daily.      EPINEPHrine 0.3 MG/0.3ML Injection Solution Auto-injector Inject 0.3 mL (1 each total) into the skin.      valACYclovir HCl 500 MG Oral Tab Take 2 tablets (1,000 mg total) by mouth daily. (Patient not taking: Reported on 3/1/2024) 60 tablet 0     Allergies:   Allergies   Allergen Reactions    Penicillins HIVES       Past Medical History:    Anxiety state    Back problem    stenosis    Breast cancer (HCC)    Cancer (HCC)    Colon polyp    Hemorrhoids    external    Muscle weakness    right leg r/t back issues    Sleep apnea    never diagnosed but pt states snores loud and wakes often    Visual impairment     Past Surgical History:   Procedure Laterality Date    Appendectomy      Colonoscopy  2017    They found polyps. My aunt  of colon cancer at 56    Colonoscopy N/A 2021    Procedure: COLONOSCOPY;  Surgeon: Andrew Keyes MD;  Location: [facility] ENDO    Lumpectomy right  2021    Umer biopsy stereo nodule 1 site right (cpt=19081)  2023    Needle biopsy right      2020          Other      discectomy lumbar    Radiation right      Spine surgery procedure unlisted      herniated disc at [facility]      Social History     Socioeconomic History    Marital status:    Tobacco Use    Smoking status: Former     Current packs/day: 0.00     Average packs/day: 0.5 packs/day for 5.0 years (2.5 ttl pk-yrs)     Types: Cigarettes     Start date: 1979     Quit date: 1984      Years since quittin.9    Smokeless tobacco: Never    Tobacco comments:     very rare-socially   Vaping Use    Vaping status: Never Used   Substance and Sexual Activity    Alcohol use: Yes     Comment: 1 glass of wine a week    Drug use: No   Social History Narrative        New  - lives in [place]     Social Determinants of Health     Financial Resource Strain: High Risk (2020)    [facility], [facility]    Overall Financial Resource Strain (CARDIA)     Difficulty of Paying Living Expenses: Hard   Food Insecurity: No Food Insecurity (2020)    [facility], [facility]    Hunger Vital Sign     Worried About Running Out of Food in the Last Year: Never true     Ran Out of Food in the Last Year: Never true   Transportation Needs: No Transportation Needs (2020)    Received [facility], [facility]    PRAPARE - Transportation     Lack of Transportation (Medical): No     Lack of Transportation (Non-Medical): No    [facility]    Social Connections    Received from [facility], [facility]    Housing Stability         Family History   Problem Relation Age of Onset    Heart Disorder Father     Stroke Father     Breast Cancer Mother 52    Cancer Mother         Breast cancer 1973 -  2006    Heart Disorder Brother     Breast Cancer Maternal Cousin Female 50    Breast Cancer Self 64    Pancreatic Cancer Maternal Aunt 57         PHYSICAL EXAM:    /70 (BP Location: Left arm, Cuff Size: adult)   Pulse 88   Temp 97.8 °F (36.6 °C) (Oral)   Resp 16   Ht 1.626 m (5' 4\")   Wt 74.6 kg (164 lb 6.4 oz)   LMP  (LMP Unknown)   SpO2 97%   BMI 28.22 kg/m²   Wt Readings from Last 6 Encounters:   24 74.6 kg (164 lb 6.4 oz)   23  69.9 kg (154 lb)   10/19/23 70.3 kg (155 lb)   05/12/23 70.2 kg (154 lb 12.8 oz)   10/28/22 72.1 kg (159 lb)   04/15/22 69.9 kg (154 lb)     Physical Exam  General: Patient is alert, not in acute distress.  HEENT: EOMs intact. PERRL.   Neck: No JVD. No palpable lymphadenopathy. Neck is supple.  Chest: Clear to auscultation.  Breasts: R breast with surgical and RT changes, lateral periareolar scar and axillary scar, no masses.  L breast no masses.   Heart: Regular rate and rhythm.   Abdomen: Soft, non tender with good bowel sounds.  Extremities: No edema.  Neurological: Grossly intact.   Lymphatics: There is no palpable lymphadenopathy throughout in the cervical, supraclavicular, axillary, or inguinal regions.  Psych/Depression: nl        ASSESSMENT/PLAN:     Encounter Diagnoses   Name Primary?    Malignant neoplasm of upper-inner quadrant of right breast in female, estrogen receptor positive (HCC) Yes    Encounter for monitoring aromatase inhibitor therapy     Encounter for follow-up surveillance of breast cancer     Post-menopausal     Screening mammogram for breast cancer     Osteopenia, unspecified location          Cancer Staging   Malignant neoplasm of upper-inner quadrant of right breast in female, estrogen receptor positive (HCC)  Staging form: Breast, AJCC 8th Edition  - Clinical stage from 1/27/2021: Stage IA (cT1c, cN0, cM0, G1, ER+, RI+, HER2-) - Signed by Estella Oshea MD on 1/27/2021  - Pathologic stage from 3/2/2021: Stage IA (pT1c, pN0(sn), cM0, G1, ER+, RI+, HER2-, Oncotype DX score: 12) - Signed by Estella Oshea MD on 3/2/2021      Ирина Hartmann is a 69 year old female with ER/RI positive breast cancer, node negative.    S/p lumpectomy and RT to the right breast.     Oncotype score low risk or recurrence.  Oncotype recurrence risk score of 12 her chance of distant recurrence at 9 years with 5 years of an aromatase inhibitor is 3%.  The benefit from chemotherapy is less than 1%, therefore not  indicated. Will proceed with adjuvant hormonal therapy alone.    Off venlafaxine as did not tolerate.     She resumed anastrozole from June to July and stopped therapy.  D/w patient data published regarding compliance with adjuvant hormonal therapy that women who stopped taking hormonal therapy early were 35% to 61% more likely to have a recurrence than women who didn’t stop taking the medicine early.    Recommend to resume an AI, she is willing to try treatment with letrozole again and if no improvement will switch to exemestane.  Will complete treatment now in October of 2027.      B mammogram BIRADS 3.  Patient was referred for bilateral imaging for follow-up of postoperative changes in the right breast, and in the left breast there was probably benign asymmetry partially effacing without evidence of ultrasound correlate for which follow-up in 6 months was recommended as well.    Bilat breast done in May 2023, right breast with recall due to grouping calcifications at the upper outer quadrant status post biopsy with benign changes and calcifications.  Right breast mammogram recommended in 6 months done in October of 2023 BIRADS-2.  Patient had mammogram done yesterday, this has not yet been read.  Patient will be notified of results, if this is BI-RADS 2, she will be due again in 1 year.  I personally reviewed the images with the patient and did not see changes from prior imaging.    Continue with diet and exercise.   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  Keep with exercise routine.  Medical Fitness program.  Osteopenia:  DEXA in October of 2023.  Mild osteopenia.  Started on fosamax by PCP.  Weight bearing exercise, calcium and vitamin D.  Next DEXA due in October of 2025.  No follow-ups on file.      No orders of the defined types were placed in this encounter.    MDM moderate risk    Results From Past 48 Hours:  No results found for this or any previous visit  (from the past 48 hour(s)).  Imaging & Referrals:  None

## 2024-06-25 ENCOUNTER — TELEPHONE (OUTPATIENT)
Facility: CLINIC | Age: 69
End: 2024-06-25

## 2024-06-25 NOTE — TELEPHONE ENCOUNTER
Patient calling to reschedule 6/27/2024 colonoscopy as she tested positive for COVID19.  Please call.  Thank you.

## 2024-10-14 ENCOUNTER — TELEPHONE (OUTPATIENT)
Facility: CLINIC | Age: 69
End: 2024-10-14

## 2024-10-14 NOTE — TELEPHONE ENCOUNTER
Patient called to find out what the next available appointment is for colonoscopy.  Patient is currently scheduled for 10/17/24.  Please call.

## 2024-10-24 ENCOUNTER — TELEPHONE (OUTPATIENT)
Facility: CLINIC | Age: 69
End: 2024-10-24

## 2024-10-24 RX ORDER — SODIUM, POTASSIUM,MAG SULFATES 17.5-3.13G
SOLUTION, RECONSTITUTED, ORAL ORAL
Qty: 1 EACH | Refills: 0 | Status: SHIPPED | OUTPATIENT
Start: 2024-10-24

## 2024-10-24 NOTE — TELEPHONE ENCOUNTER
Patient has procedure on 10/29 and needs preparation sent to [facility]. Please update patient through [employer], thanks.

## 2024-10-24 NOTE — TELEPHONE ENCOUNTER
Bowel prep sent to preferred pharmacy    Sent patient message via [employer]

## 2024-10-28 ENCOUNTER — TELEPHONE (OUTPATIENT)
Facility: CLINIC | Age: 69
End: 2024-10-28

## 2024-10-28 NOTE — TELEPHONE ENCOUNTER
RN called and spoke to pt. RN answered pt's questions. Pt states she accidentally ate a little food this afternoon (at 1100) and had a couple bites of a beet. Discussed proceeding as planned but reiterated to have on CLD from now on. Discussed diet, bowel prep, and timing. Pt verbalized understanding and had no further needs.

## 2024-10-28 NOTE — TELEPHONE ENCOUNTER
Patient has questions regarding bowel preparation.  Patient is scheduled for a Colonoscopy tomorrow morning.  Please call

## 2024-10-29 ENCOUNTER — TELEPHONE (OUTPATIENT)
Facility: CLINIC | Age: 69
End: 2024-10-29

## 2024-10-29 DIAGNOSIS — R10.9 STOMACH PAIN: Primary | ICD-10-CM

## 2024-10-29 NOTE — TELEPHONE ENCOUNTER
GI staff: please place recall for colonoscopy in 7 years       Hpylori ordered due to stomach upset  If continues then follow up in office

## 2024-10-29 NOTE — TELEPHONE ENCOUNTER
Recall colonoscopy in 7 years per Dr. Keyes. Colonoscopy done on 10/29/24.    Health maintenance updated and message sent to pt outreach to repeat colonoscopy in 7 years.    RN called pt, no answer so left message instructing her to check her [employer] account.  [employer] message sent by RN with instructions r/t h pylori testing. GI office number provided for any needs.

## 2024-11-06 ENCOUNTER — TELEPHONE (OUTPATIENT)
Facility: CLINIC | Age: 69
End: 2024-11-06

## 2024-11-06 NOTE — TELEPHONE ENCOUNTER
----- Message from Andrew Keyes sent at 10/31/2024  2:27 PM CDT -----  GI staff: please place recall for colonoscopy in 7 years

## 2024-11-25 ENCOUNTER — OFFICE VISIT (OUTPATIENT)
Dept: HEMATOLOGY/ONCOLOGY | Facility: HOSPITAL | Age: 69
End: 2024-11-25
Attending: INTERNAL MEDICINE
Payer: MEDICARE

## 2024-11-25 VITALS
RESPIRATION RATE: 16 BRPM | HEART RATE: 84 BPM | DIASTOLIC BLOOD PRESSURE: 64 MMHG | TEMPERATURE: 98 F | OXYGEN SATURATION: 96 % | HEIGHT: 64 IN | WEIGHT: 164 LBS | BODY MASS INDEX: 28 KG/M2 | SYSTOLIC BLOOD PRESSURE: 109 MMHG

## 2024-11-25 DIAGNOSIS — Z85.3 ENCOUNTER FOR FOLLOW-UP SURVEILLANCE OF BREAST CANCER: ICD-10-CM

## 2024-11-25 DIAGNOSIS — Z12.31 SCREENING MAMMOGRAM FOR BREAST CANCER: ICD-10-CM

## 2024-11-25 DIAGNOSIS — C50.211 MALIGNANT NEOPLASM OF UPPER-INNER QUADRANT OF RIGHT BREAST IN FEMALE, ESTROGEN RECEPTOR POSITIVE (HCC): Primary | ICD-10-CM

## 2024-11-25 DIAGNOSIS — Z79.811 ENCOUNTER FOR MONITORING AROMATASE INHIBITOR THERAPY: ICD-10-CM

## 2024-11-25 DIAGNOSIS — Z17.0 MALIGNANT NEOPLASM OF UPPER-INNER QUADRANT OF RIGHT BREAST IN FEMALE, ESTROGEN RECEPTOR POSITIVE (HCC): Primary | ICD-10-CM

## 2024-11-25 DIAGNOSIS — Z08 ENCOUNTER FOR FOLLOW-UP SURVEILLANCE OF BREAST CANCER: ICD-10-CM

## 2024-11-25 DIAGNOSIS — Z51.81 ENCOUNTER FOR MONITORING AROMATASE INHIBITOR THERAPY: ICD-10-CM

## 2024-11-25 PROCEDURE — 99211 OFF/OP EST MAY X REQ PHY/QHP: CPT

## 2024-11-25 RX ORDER — EXEMESTANE 25 MG/1
25 TABLET ORAL DAILY
Qty: 30 TABLET | Refills: 11 | Status: SHIPPED | OUTPATIENT
Start: 2024-11-25

## 2024-11-25 NOTE — PROGRESS NOTES
HPI     Ирина Hartmann is a 69 year old female here for follow up of diagnosis of   Encounter Diagnoses   Name Primary?    Malignant neoplasm of upper-inner quadrant of right breast in female, estrogen receptor positive (HCC) Yes    Encounter for monitoring aromatase inhibitor therapy     Encounter for follow-up surveillance of breast cancer     Screening mammogram for breast cancer         States was helping take care of her grandchildren this summer and also working a lot.  Going through a divorce.      Patient's weight has remained stable.  She also was started on ozempic 2 weeks ago.      States more sore on the R breast.  No changes on self breast exam.    Was taking letrozole until August.  She stopped taking it due to joint aches and hair thinning.      Mild hot flashes at night.       ECOG PS 0      Review of Systems:   Review of Systems   Constitutional:  Negative for appetite change, fatigue and unexpected weight change.   Respiratory:  Negative for cough and shortness of breath.    Cardiovascular:  Negative for chest pain.   Gastrointestinal:  Negative for abdominal pain.   Endocrine: Positive for hot flashes.   Genitourinary:  Negative for dysuria and frequency.    Musculoskeletal:  Positive for arthralgias (with AI). Negative for back pain and neck pain.        No bone pain   Neurological:  Negative for dizziness and headaches.   Hematological:  Negative for adenopathy.   Psychiatric/Behavioral:  Positive for sleep disturbance (may have EULALIA.  Travels a lot and has to stay at hotels.).            Current Outpatient Medications   Medication Sig Dispense Refill    letrozole 2.5 MG Oral Tab Take 1 tablet (2.5 mg total) by mouth daily. 90 tablet 3    ergocalciferol 1.25 MG (30220 UT) Oral Cap Take 1 capsule (50,000 Units total) by mouth once a week.      fluticasone propionate 50 MCG/ACT Nasal Suspension 1 spray by Nasal route 2 (two) times daily. 16 g 5    Multiple Vitamin (MULTI-VITAMIN DAILY) Oral Tab Take  by mouth.      alendronate 70 MG Oral Tab Take 1 tablet (70 mg total) by mouth once a week. (Patient not taking: Reported on 2024)      azelastine 0.1 % Nasal Solution 2 sprays by Nasal route 2 (two) times daily. (Patient not taking: Reported on 2024) 30 mL 5    Pyridoxine HCl (VITAMIN B-6 OR) Take by mouth. (Patient not taking: Reported on 2024)      Cholecalciferol (VITAMIN D3) 75 MCG (3000 UT) Oral Tab Take by mouth daily as needed. (Patient not taking: Reported on 2024)      Calcium Carbonate (CALCIUM 500 OR) Take 1 tablet by mouth daily. (Patient not taking: Reported on 2024)      EPINEPHrine 0.3 MG/0.3ML Injection Solution Auto-injector Inject 0.3 mL (1 each total) into the skin. (Patient not taking: Reported on 2024)      valACYclovir HCl 500 MG Oral Tab Take 2 tablets (1,000 mg total) by mouth daily. (Patient not taking: Reported on 2024) 60 tablet 0     Allergies:   Allergies   Allergen Reactions    Penicillins HIVES       Past Medical History:    Anxiety state    Back problem    stenosis    Breast cancer (HCC)    Cancer (HCC)    Colon polyp    Hemorrhoids    external    Muscle weakness    right leg r/t back issues    PONV (postoperative nausea and vomiting)    Sleep apnea    never diagnosed but pt states snores loud and wakes often    Visual impairment    reading glasses     Past Surgical History:   Procedure Laterality Date    Appendectomy      Colonoscopy  2017    They found polyps. My aunt  of colon cancer at 56    Colonoscopy N/A 2021    Procedure: COLONOSCOPY;  Surgeon: Andrew Keyes MD;  Location: [facility] ENDO    Colonoscopy N/A 10/29/2024    Procedure: COLONOSCOPY;  Surgeon: Andrew Keyes MD;  Location: [facility] MAIN OR    Lumpectomy right  2021    Umer biopsy stereo nodule 1 site right (cpt=19081)  2023    Needle biopsy right      2020          Other      discectomy lumbar    Radiation right      Spine surgery procedure unlisted       herniated disc at [facility]      Social History     Socioeconomic History    Marital status:    Tobacco Use    Smoking status: Former     Current packs/day: 0.00     Average packs/day: 0.5 packs/day for 5.0 years (2.5 ttl pk-yrs)     Types: Cigarettes     Start date: 1979     Quit date: 1984     Years since quittin.4    Smokeless tobacco: Never    Tobacco comments:     very rare-socially   Vaping Use    Vaping status: Never Used   Substance and Sexual Activity    Alcohol use: Yes     Comment: 1 glass of wine a week    Drug use: No   Social History Narrative        New  - lives in [place]     Social Drivers of Health     Financial Resource Strain: High Risk (2020)    [facility]    Overall Financial Resource Strain (CARDIA)     Difficulty of Paying Living Expenses: Hard   Food Insecurity: No Food Insecurity (2020)    [facility]    Hunger Vital Sign     Worried About Running Out of Food in the Last Year: Never true     Ran Out of Food in the Last Year: Never true   Transportation Needs: No Transportation Needs (2020)    [facility]    PRAPARE - Transportation     Lack of Transportation (Medical): No     Lack of Transportation (Non-Medical): No    [facility]    Social Connections    Received from [facility], [facility]    Housing Stability         Family History   Problem Relation Age of Onset    Heart Disorder Father     Stroke Father     Breast Cancer Mother 52    Cancer Mother         Breast cancer 1973 -  2006    Heart Disorder Brother     Breast Cancer Maternal Cousin Female 50    Breast Cancer Self 64    Pancreatic Cancer Maternal Aunt  57         PHYSICAL EXAM:    /64 (BP Location: Left arm, Patient Position: Sitting, Cuff Size: adult)   Pulse 84   Temp 98.3 °F (36.8 °C) (Oral)   Resp 16   Ht 1.626 m (5' 4\")   Wt 74.4 kg (164 lb)   LMP  (LMP Unknown)   SpO2 96%   BMI 28.15 kg/m²   Wt Readings from Last 6 Encounters:   11/25/24 74.4 kg (164 lb)   10/23/24 71.7 kg (158 lb)   05/21/24 74.6 kg (164 lb 6.4 oz)   11/21/23 69.9 kg (154 lb)   10/19/23 70.3 kg (155 lb)   05/12/23 70.2 kg (154 lb 12.8 oz)     Physical Exam  General: Patient is alert, not in acute distress.  HEENT: EOMs intact. PERRL.   Neck: No JVD. No palpable lymphadenopathy. Neck is supple.  Chest: Clear to auscultation.  Breasts: R breast with surgical and RT changes, lateral periareolar scar and axillary scar, no masses.  L breast no masses.   Heart: Regular rate and rhythm.   Abdomen: Soft, non tender with good bowel sounds.  Extremities: No edema.  Neurological: Grossly intact.   Lymphatics: There is no palpable lymphadenopathy throughout in the cervical, supraclavicular, axillary, or inguinal regions.  Psych/Depression: nl        ASSESSMENT/PLAN:     Encounter Diagnoses   Name Primary?    Malignant neoplasm of upper-inner quadrant of right breast in female, estrogen receptor positive (HCC) Yes    Encounter for monitoring aromatase inhibitor therapy     Encounter for follow-up surveillance of breast cancer     Screening mammogram for breast cancer          Cancer Staging   Malignant neoplasm of upper-inner quadrant of right breast in female, estrogen receptor positive (HCC)  Staging form: Breast, AJCC 8th Edition  - Clinical stage from 1/27/2021: Stage IA (cT1c, cN0, cM0, G1, ER+, HI+, HER2-) - Signed by Estella Oshea MD on 1/27/2021  - Pathologic stage from 3/2/2021: Stage IA (pT1c, pN0(sn), cM0, G1, ER+, HI+, HER2-, Oncotype DX score: 12) - Signed by Estella Oshea MD on 3/2/2021      Ирина Hartmann is a 69 year old female with ER/HI positive breast cancer, node  negative.    S/p lumpectomy and RT to the right breast.     Oncotype score low risk or recurrence.  Oncotype recurrence risk score of 12 her chance of distant recurrence at 9 years with 5 years of an aromatase inhibitor is 3%.  The benefit from chemotherapy is less than 1%, therefore not indicated. Will proceed with adjuvant hormonal therapy alone.    Off venlafaxine as did not tolerate.     She resumed anastrozole from June to July and stopped therapy.  Was started on letrozole and stopped in August of 2024 due to arthralgias.  Will switch to exemestane.  D/w patient data published regarding compliance with adjuvant hormonal therapy that women who stopped taking hormonal therapy early were 35% to 61% more likely to have a recurrence than women who didn’t stop taking the medicine early.    Will complete treatment for 5 years in October of 2027.      Bilateral breast imaging on 5/20/2024 BI-RADS 2, due for yearly screening mammogram on May 2025.    Continue with diet and exercise.   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  Keep with exercise routine.  Medical Fitness program.  Osteopenia:  DEXA in October of 2023.  Mild osteopenia.  Started on fosamax by PCP.  Weight bearing exercise, calcium and vitamin D.  Next DEXA due in October of 2025.  BMI 28: Weight loss clinic.  No follow-ups on file.      No orders of the defined types were placed in this encounter.    MDM moderate risk  Continuity of complex medical care    Results From Past 48 Hours:  No results found for this or any previous visit (from the past 48 hours).  Imaging & Referrals:  [facility] 2D+3D SCREENING BILAT (CPT=77067/02885)

## 2025-01-25 NOTE — LETTER
- Follow up with primary care physician in 1 week.   - Follow up with radiation oncologist as instructed  -Please do not take NSAIDs while you are on steroids.  You can take Tylenol for pain  - Please take the medications as instructed    - Go to the local Emergency Department if you have any worsening condition.      2/6/2024    Ирина Hartmann        [id number]        [facility] [id number]            Dear Ирина Hartmann,      Our records indicate that you are due for an appointment for a Colonoscopy with Andrew Keyes MD. Our doctors are booking out about 3-6 months in advance for procedures.     Please call our office to schedule a phone screening appointment to plan for the procedure(s).   Your medical well-being is important to us.    If your insurance requires a referral, please call your primary care office to request one.      Thank you,      The Physicians and Staff at [facility]

## 2025-05-12 ENCOUNTER — OFFICE VISIT (OUTPATIENT)
Dept: PODIATRY CLINIC | Facility: CLINIC | Age: 70
End: 2025-05-12
Payer: MEDICARE

## 2025-05-12 VITALS — SYSTOLIC BLOOD PRESSURE: 116 MMHG | DIASTOLIC BLOOD PRESSURE: 72 MMHG

## 2025-05-12 DIAGNOSIS — G57.82 NEUROMA OF THIRD INTERSPACE OF LEFT FOOT: Primary | ICD-10-CM

## 2025-05-12 DIAGNOSIS — G57.62 MORTON'S METATARSALGIA, NEURALGIA, OR NEUROMA, LEFT: ICD-10-CM

## 2025-05-12 PROCEDURE — 1125F AMNT PAIN NOTED PAIN PRSNT: CPT | Performed by: PODIATRIST

## 2025-05-12 PROCEDURE — 1159F MED LIST DOCD IN RCRD: CPT | Performed by: PODIATRIST

## 2025-05-12 PROCEDURE — 99213 OFFICE O/P EST LOW 20 MIN: CPT | Performed by: PODIATRIST

## 2025-05-12 RX ORDER — CLARITHROMYCIN 500 MG/1
500 TABLET ORAL 2 TIMES DAILY
COMMUNITY
Start: 2025-01-11

## 2025-05-12 RX ORDER — TRIAMCINOLONE ACETONIDE 40 MG/ML
20 INJECTION, SUSPENSION INTRA-ARTICULAR; INTRAMUSCULAR ONCE
Status: SHIPPED | OUTPATIENT
Start: 2025-05-12

## 2025-05-12 RX ORDER — VALACYCLOVIR HYDROCHLORIDE 500 MG/1
500 TABLET, FILM COATED ORAL DAILY
COMMUNITY
Start: 2025-01-02

## 2025-05-12 RX ORDER — CELECOXIB 200 MG/1
200 CAPSULE ORAL 2 TIMES DAILY
COMMUNITY
Start: 2024-12-30

## 2025-05-12 RX ORDER — OSELTAMIVIR PHOSPHATE 75 MG/1
75 CAPSULE ORAL 2 TIMES DAILY
COMMUNITY
Start: 2025-03-07

## 2025-05-12 RX ORDER — AZITHROMYCIN 250 MG/1
500 TABLET, FILM COATED ORAL DAILY
COMMUNITY
Start: 2025-04-21

## 2025-05-12 RX ORDER — ALBUTEROL SULFATE 90 UG/1
2 INHALANT RESPIRATORY (INHALATION) EVERY 4 HOURS PRN
COMMUNITY
Start: 2025-04-22

## 2025-05-12 NOTE — PROGRESS NOTES
Ирина Hartmann is a 69 year old female.   Chief Complaint   Patient presents with    Injection     Left foot pain- pain 4/10 increasing with activity         HPI:   Patient returns to the clinic she recently wore a pair of heels aggravated the condition that she has in her left foot she thinks she needs another cortisone injection that lasted about 3 or 4 months.  At today's visit reviewed nurse's history as taken above, allergies medications and medical history as documented below.  All changes duly noted  Allergies: Penicillins   Current Medications[1]   Past Medical History[2]   Past Surgical History[3]   Family History[4]   Social Hx on file[5]        REVIEW OF SYSTEMS:   Today reviewed systens as documented below  GENERAL HEALTH: feels well otherwise  SKIN: Refer to exam below  RESPIRATORY: denies shortness of breath with exertion  CARDIOVASCULAR: denies chest pain on exertion  GI: denies abdominal pain and denies heartburn  NEURO: denies headaches    EXAM:   LMP  (LMP Unknown)   GENERAL: well developed, well nourished, in no apparent distress  EXTREMITIES:   1. Integument: The patient has intact skin on the left   2. Vascular: Patient has palpable pulses   3. Neurologic: Patient has positive Shannan's test in the third interspace of the left foot   4. Musculoskeletal: Has good muscle strength she is ambulatory.    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  This was accomplished utilizing 20 mg of Kenalog and 0.5 cc of 0.5% Marcaine plain as a peripheral nerve block and a Band-Aid was applied to this injection site today to further increase arch height as well as offload the ball of the foot temporary orthotics were  fit and dispensed to the patient she was told she has to remove the insoles from the shoes that she is wearing and then use the supports.  Follow-up as needed.    The patient indicates understanding of these issues and agrees to the plan.    Skyler An DPM       [1]   Current Outpatient Medications   Medication Sig Dispense Refill    albuterol 108 (90 Base) MCG/ACT Inhalation Aero Soln Inhale 2 puffs into the lungs every 4 (four) hours as needed.      azithromycin 250 MG Oral Tab Take 2 tablets (500 mg total) by mouth daily.      celecoxib 200 MG Oral Cap Take 1 capsule (200 mg total) by mouth 2 (two) times daily.      clarithromycin 500 MG Oral Tab Take 1 tablet (500 mg total) by mouth 2 (two) times daily.      oseltamivir 75 MG Oral Cap Take 1 capsule (75 mg total) by mouth 2 (two) times daily.      valACYclovir 500 MG Oral Tab Take 1 tablet (500 mg total) by mouth daily.      exemestane 25 MG Oral Tab Take 1 tablet (25 mg total) by mouth daily. 30 tablet 11    ergocalciferol 1.25 MG (28895 UT) Oral Cap Take 1 capsule (50,000 Units total) by mouth once a week.      fluticasone propionate 50 MCG/ACT Nasal Suspension 1 spray by Nasal route 2 (two) times daily. 16 g 5    Multiple Vitamin (MULTI-VITAMIN DAILY) Oral Tab Take by mouth.      Pyridoxine HCl (VITAMIN B-6 OR) Take by mouth.      azelastine 0.1 % Nasal Solution 2 sprays by Nasal route 2 (two) times daily. (Patient not taking: Reported on 11/25/2024) 30 mL 5    Cholecalciferol (VITAMIN D3) 75 MCG (3000 UT) Oral Tab Take by mouth daily as needed. (Patient not taking: Reported on 11/25/2024)      Calcium Carbonate (CALCIUM 500 OR) Take 1 tablet by mouth daily. (Patient not taking: Reported on 11/25/2024)      EPINEPHrine 0.3 MG/0.3ML Injection Solution Auto-injector Inject 0.3 mL (1 each total) into the skin. (Patient not taking: Reported on 11/25/2024)     [2]   Past Medical History:   Anxiety state    Back problem    stenosis    Breast cancer (HCC)     Cancer (HCC)    Colon polyp    Hemorrhoids    external    Muscle weakness    right leg r/t back issues    PONV (postoperative nausea and vomiting)    Sleep apnea    never diagnosed but pt states snores loud and wakes often    Visual impairment    reading glasses   [3]   Past Surgical History:  Procedure Laterality Date    Appendectomy      Colonoscopy  2017    They found polyps. My aunt  of colon cancer at 56    Colonoscopy N/A 2021    Procedure: COLONOSCOPY;  Surgeon: Andrew Keyes MD;  Location: [facility] ENDO    Colonoscopy N/A 10/29/2024    Procedure: COLONOSCOPY;  Surgeon: Andrew Keyes MD;  Location[facility] MA[facility] OR    Lumpectomy right  2021    Umer biopsy stereo nodule 1 site right (cpt=19081)  2023    Needle biopsy right      2020          Other      discectomy lumbar    Radiation right      Spine surgery procedure unlisted      herniated disc at [facility]    [4]   Family History  Problem Relation Age of Onset    Heart Disorder Father     Stroke Father     Breast Cancer Mother 52    Cancer Mother         Breast cancer 1973 -  2006    Heart Disorder Brother     Breast Cancer Maternal Cousin Female 50    Breast Cancer Self 64    Pancreatic Cancer Maternal Aunt 57   [5]   Social History  Socioeconomic History    Marital status:    Tobacco Use    Smoking status: Former     Current packs/day: 0.00     Average packs/day: 0.5 packs/day for 5.0 years (2.5 ttl pk-yrs)     Types: Cigarettes     Start date: 1979     Quit date: 1984     Years since quittin.9    Smokeless tobacco: Never    Tobacco comments:     very rare-socially   Vaping Use    Vaping status: Never Used   Substance and Sexual Activity    Alcohol use: Yes     Comment: 1 glass of wine a week    Drug use: No

## 2025-05-22 ENCOUNTER — TELEPHONE (OUTPATIENT)
Age: 70
End: 2025-05-22

## 2025-05-22 NOTE — TELEPHONE ENCOUNTER
Anesthesia Post Evaluation    Patient: Anna Suarez    Procedure(s) Performed: Procedure(s) (LRB):  ACDF C6-C7 (N/A)    Final Anesthesia Type: general      Patient location during evaluation: PACU  Patient participation: Yes- Able to Participate  Level of consciousness: awake and alert and oriented  Post-procedure vital signs: reviewed and stable  Pain management: adequate  Airway patency: patent    PONV status at discharge: No PONV  Anesthetic complications: no      Cardiovascular status: hemodynamically stable and blood pressure returned to baseline  Respiratory status: spontaneous ventilation, room air and unassisted  Hydration status: euvolemic  Follow-up not needed.              Vitals Value Taken Time   /75 11/18/24 1402   Temp 36.7 °C (98 °F) 11/18/24 1402   Pulse 84 11/18/24 1402   Resp 18 11/18/24 1402   SpO2 96 % 11/18/24 1402         Event Time   Out of Recovery 11:33:00         Pain/Med Score: Pain Rating Prior to Med Admin: 5 (11/18/2024  1:45 PM)  Pain Rating Post Med Admin: 3 (11/18/2024 11:25 AM)  Med Score: 10 (11/18/2024 11:37 AM)           Returned pt call, appts adjusted per pt request

## 2025-05-22 NOTE — TELEPHONE ENCOUNTER
Pt called she is requesting to speak with a nurse in regards to her mammogram appointment and follow up appointment with       Please give pt a call back [phone]    Thank you

## 2025-05-23 ENCOUNTER — OFFICE VISIT (OUTPATIENT)
Age: 70
End: 2025-05-23
Attending: INTERNAL MEDICINE
Payer: MEDICARE

## 2025-05-23 ENCOUNTER — HOSPITAL ENCOUNTER (OUTPATIENT)
Dept: MAMMOGRAPHY | Facility: HOSPITAL | Age: 70
Discharge: HOME | End: 2025-05-23
Attending: INTERNAL MEDICINE
Payer: MEDICARE

## 2025-05-23 VITALS
DIASTOLIC BLOOD PRESSURE: 79 MMHG | HEIGHT: 64 IN | WEIGHT: 163 LBS | BODY MASS INDEX: 27.83 KG/M2 | RESPIRATION RATE: 18 BRPM | HEART RATE: 100 BPM | OXYGEN SATURATION: 94 % | SYSTOLIC BLOOD PRESSURE: 130 MMHG | TEMPERATURE: 99 F

## 2025-05-23 DIAGNOSIS — Z12.31 SCREENING MAMMOGRAM FOR BREAST CANCER: ICD-10-CM

## 2025-05-23 DIAGNOSIS — C50.211 MALIGNANT NEOPLASM OF UPPER-INNER QUADRANT OF RIGHT BREAST IN FEMALE, ESTROGEN RECEPTOR POSITIVE (HCC): Primary | ICD-10-CM

## 2025-05-23 DIAGNOSIS — M85.80 OSTEOPENIA AFTER MENOPAUSE: ICD-10-CM

## 2025-05-23 DIAGNOSIS — Z17.0 MALIGNANT NEOPLASM OF UPPER-INNER QUADRANT OF RIGHT BREAST IN FEMALE, ESTROGEN RECEPTOR POSITIVE (HCC): Primary | ICD-10-CM

## 2025-05-23 DIAGNOSIS — Z78.0 OSTEOPENIA AFTER MENOPAUSE: ICD-10-CM

## 2025-05-23 DIAGNOSIS — Z08 ENCOUNTER FOR FOLLOW-UP SURVEILLANCE OF BREAST CANCER: ICD-10-CM

## 2025-05-23 DIAGNOSIS — Z85.3 ENCOUNTER FOR FOLLOW-UP SURVEILLANCE OF BREAST CANCER: ICD-10-CM

## 2025-05-23 DIAGNOSIS — Z79.811 ENCOUNTER FOR MONITORING AROMATASE INHIBITOR THERAPY: ICD-10-CM

## 2025-05-23 DIAGNOSIS — Z51.81 ENCOUNTER FOR MONITORING AROMATASE INHIBITOR THERAPY: ICD-10-CM

## 2025-05-23 PROCEDURE — 77063 BREAST TOMOSYNTHESIS BI: CPT | Performed by: INTERNAL MEDICINE

## 2025-05-23 PROCEDURE — 77067 SCR MAMMO BI INCL CAD: CPT | Performed by: INTERNAL MEDICINE

## 2025-05-23 RX ORDER — EXEMESTANE 25 MG/1
25 TABLET ORAL DAILY
Qty: 30 TABLET | Refills: 11 | Status: SHIPPED | OUTPATIENT
Start: 2025-05-23

## 2025-05-23 NOTE — PROGRESS NOTES
HPI     Ирина Hartmann is a 70 year old female here for follow up of diagnosis of   Encounter Diagnoses   Name Primary?    Malignant neoplasm of upper-inner quadrant of right breast in female, estrogen receptor positive (HCC) Yes    Encounter for monitoring aromatase inhibitor therapy     Encounter for follow-up surveillance of breast cancer     Screening mammogram for breast cancer     Osteopenia, unspecified location         Busy traveling for work. Still going through divorce.    Patient's weight has remained stable.       States still sore the R breast.  No changes on self breast exam.  Had mammogram this am.    Taking exemestane but not all the time, states due to joint aches.  She states that stopped treatment the aches are better and then she takes it again.     Mild hot flashes at night.     Smoking once a week from stress.    ECOG PS 0      Review of Systems:   Review of Systems   Constitutional:  Negative for appetite change, fatigue and unexpected weight change.   Respiratory:  Negative for cough and shortness of breath.    Cardiovascular:  Negative for chest pain.   Gastrointestinal:  Negative for abdominal pain.   Endocrine: Positive for hot flashes.   Genitourinary:  Negative for dysuria and frequency.    Musculoskeletal:  Positive for arthralgias (with AI.  R shoulder pain better with weights). Negative for back pain and neck pain.        No bone pain   Neurological:  Negative for dizziness and headaches.   Hematological:  Negative for adenopathy.   Psychiatric/Behavioral:  Positive for sleep disturbance (may have EULALIA.  Travels a lot and has to stay at hotels.).            Current Outpatient Medications   Medication Sig Dispense Refill    albuterol 108 (90 Base) MCG/ACT Inhalation Aero Soln Inhale 2 puffs into the lungs every 4 (four) hours as needed.      exemestane 25 MG Oral Tab Take 1 tablet (25 mg total) by mouth daily. 30 tablet 11    ergocalciferol 1.25 MG (50555 UT) Oral Cap Take 1 capsule  (50,000 Units total) by mouth once a week.      Multiple Vitamin (MULTI-VITAMIN DAILY) Oral Tab Take by mouth.      valACYclovir 500 MG Oral Tab Take 1 tablet (500 mg total) by mouth daily. (Patient not taking: Reported on 2025)      azelastine 0.1 % Nasal Solution 2 sprays by Nasal route 2 (two) times daily. (Patient not taking: Reported on 10/23/2024) 30 mL 5    fluticasone propionate 50 MCG/ACT Nasal Suspension 1 spray by Nasal route 2 (two) times daily. (Patient not taking: Reported on 2025) 16 g 5    Pyridoxine HCl (VITAMIN B-6 OR) Take by mouth. (Patient not taking: Reported on 2025)      Calcium Carbonate (CALCIUM 500 OR) Take 1 tablet by mouth daily. (Patient not taking: Reported on 2025)      EPINEPHrine 0.3 MG/0.3ML Injection Solution Auto-injector Inject 0.3 mL (1 each total) into the skin. (Patient not taking: Reported on 2025)       Allergies:   Allergies   Allergen Reactions    Penicillins HIVES       Past Medical History:    Anxiety state    Back problem    stenosis    Breast cancer (HCC)    Cancer (HCC)    Colon polyp    Hemorrhoids    external    Muscle weakness    right leg r/t back issues    PONV (postoperative nausea and vomiting)    Sleep apnea    never diagnosed but pt states snores loud and wakes often    Visual impairment    reading glasses     Past Surgical History:   Procedure Laterality Date    Appendectomy      Colonoscopy      They found polyps. My aunt  of colon cancer at 56    Colonoscopy N/A 2021    Procedure: COLONOSCOPY;  Surgeon: Andrew Keyes MD;  Location: [facility] ENDO    Colonoscopy N/A 10/29/2024    Procedure: COLONOSCOPY;  Surgeon: Andrew Keyes MD;  Location: [facility] MAIN OR    Lumpectomy right Right 2021    [facility]    Umer biopsy stereo nodule 1 site right (cpt=19081)  2023    Needle biopsy right      2020          Other      discectomy lumbar    Radiation right      Spine surgery procedure unlisted      herniated disc  at [facility]      Social History     Socioeconomic History    Marital status:    Tobacco Use    Smoking status: Former     Current packs/day: 0.00     Average packs/day: 0.5 packs/day for 5.0 years (2.5 ttl pk-yrs)     Types: Cigarettes     Start date: 1979     Quit date: 1984     Years since quittin.9    Smokeless tobacco: Never    Tobacco comments:     very rare-socially   Vaping Use    Vaping status: Never Used   Substance and Sexual Activity    Alcohol use: Yes     Comment: 1 glass of wine a week    Drug use: No   Social History Narrative        New  - lives in [place]     Social Drivers of Health     Food Insecurity: No Food Insecurity (2020)    Received from [facility]    Hunger Vital Sign     Worried About Running Out of Food in the Last Year: Never true     Ran Out of Food in the Last Year: Never true   Transportation Needs: No Transportation Needs (2020)    Received from [facility]    PRAPARE - Transportation     Lack of Transportation (Medical): No     Lack of Transportation (Non-Medical): No    Received from [facility]    Housing Stability         Family History   Problem Relation Age of Onset    Heart Disorder Father     Stroke Father     Breast Cancer Mother 52    Cancer Mother         Breast cancer 1973 -  2006    Heart Disorder Brother     Breast Cancer Maternal Cousin Female 50    Breast Cancer Self 64    Pancreatic Cancer Maternal Aunt 57         PHYSICAL EXAM:    /79 (BP Location: Left arm, Patient Position: Sitting, Cuff Size: adult)   Pulse 100   Temp 98.5 °F (36.9 °C) (Tympanic)   Resp 18   Ht 1.626 m (5' 4\")   Wt 73.9 kg (163 lb)   LMP  (LMP Unknown)   SpO2 94%   BMI 27.98 kg/m²   Wt Readings from Last 6 Encounters:   25 73.9 kg (163 lb)   24 74.4 kg (164 lb)   10/23/24 71.7 kg (158 lb)   24 74.6 kg (164 lb 6.4 oz)   23 69.9 kg (154  lb)   10/19/23 70.3 kg (155 lb)     Physical Exam  General: Patient is alert, not in acute distress.  HEENT: EOMs intact. PERRL.   Neck: No JVD. No palpable lymphadenopathy. Neck is supple.  Chest: Clear to auscultation.  Breasts: R breast with surgical and RT changes, lateral periareolar scar and axillary scar, no masses.  L breast no masses.   Heart: Regular rate and rhythm.   Abdomen: Soft, non tender with good bowel sounds.  Extremities: No edema.  Neurological: Grossly intact.   Lymphatics: There is no palpable lymphadenopathy throughout in the cervical, supraclavicular, axillary, or inguinal regions.  Psych/Depression: nl        ASSESSMENT/PLAN:     Encounter Diagnoses   Name Primary?    Malignant neoplasm of upper-inner quadrant of right breast in female, estrogen receptor positive (HCC) Yes    Encounter for monitoring aromatase inhibitor therapy     Encounter for follow-up surveillance of breast cancer     Screening mammogram for breast cancer     Osteopenia, unspecified location          Cancer Staging   Malignant neoplasm of upper-inner quadrant of right breast in female, estrogen receptor positive (HCC)  Staging form: Breast, AJCC 8th Edition  - Clinical stage from 1/27/2021: Stage IA (cT1c, cN0, cM0, G1, ER+, HI+, HER2-) - Signed by Estella Oshea MD on 1/27/2021  - Pathologic stage from 3/2/2021: Stage IA (pT1c, pN0(sn), cM0, G1, ER+, HI+, HER2-, Oncotype DX score: 12) - Signed by Estella Oshea MD on 3/2/2021      Ирина Hartmann is a 70 year old female with ER/HI positive breast cancer, node negative.    S/p lumpectomy and RT to the right breast.     Oncotype score low risk or recurrence.  Oncotype recurrence risk score of 12 her chance of distant recurrence at 9 years with 5 years of an aromatase inhibitor is 3%.  The benefit from chemotherapy is less than 1%, therefore not indicated. Will proceed with adjuvant hormonal therapy alone.    Off venlafaxine as did not tolerate.     She resumed  anastrozole from June to July and stopped therapy.  Was started on letrozole and stopped in August of 2024 due to arthralgias.  Switched to exemestane.  D/w patient data published regarding compliance with adjuvant hormonal therapy that women who stopped taking hormonal therapy early were 35% to 61% more likely to have a recurrence than women who didn’t stop taking the medicine early.    Will complete treatment for 5 years in October of 2027.  Discussed data past 5 yrs, will re-evaluate closer to completion month.     Bilateral breast imaging done today and is pending, will notify of results.      Continue with diet and exercise.   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  Keep with exercise routine.  Medical Fitness program.  Osteopenia:  DEXA in October of 2023.  Mild osteopenia.  Started on fosamax by PCP.  Weight bearing exercise, calcium and vitamin D.  Next DEXA due in October of 2025.  BMI 28: Weight loss clinic.    Quality measures:  Hypertension target range 130-140/70-80  Smoking cessation  Per PCP, recommend smoking cessation    No follow-ups on file.      No orders of the defined types were placed in this encounter.    MDM moderate risk  Continuity of complex medical care    Results From Past 48 Hours:  No results found for this or any previous visit (from the past 48 hours).  Imaging & Referrals:  None

## 2025-05-29 ENCOUNTER — TELEPHONE (OUTPATIENT)
Age: 70
End: 2025-05-29

## 2025-05-29 NOTE — TELEPHONE ENCOUNTER
Patient called and notified that Mammogram has not been read yet by radiology. Informed will show in mychart when read by radiologist.

## 2025-06-02 ENCOUNTER — APPOINTMENT (OUTPATIENT)
Age: 70
End: 2025-06-02
Attending: INTERNAL MEDICINE
Payer: MEDICARE

## (undated) DEVICE — HEXAGONAL CAPTIVATOR STIFF 13M

## (undated) DEVICE — CLIP LGT 11MM OPEN 2.8MM 235CM

## (undated) DEVICE — SUTURE VICRYL 3-0 SH

## (undated) DEVICE — ACCUVAC SMOKE ATTACHMENT

## (undated) DEVICE — MEDI-VAC NON-CONDUCTIVE SUCTION TUBING 6MM X 1.8M (6FT.) L: Brand: CARDINAL HEALTH

## (undated) DEVICE — FLEXIBLE YANKAUER,MEDIUM TIP, NO VACUUM CONTROL: Brand: ARGYLE

## (undated) DEVICE — 35 ML SYRINGE REGULAR TIP: Brand: MONOJECT

## (undated) DEVICE — TRAP MCS 40ML 5IN PLS SCR CAP

## (undated) DEVICE — SOL  .9 1000ML BTL

## (undated) DEVICE — Device: Brand: CUSTOM PROCEDURE KIT

## (undated) DEVICE — SUTURE MONOCRYL 4-0 PS-2

## (undated) DEVICE — STERILE LATEX POWDER-FREE SURGICAL GLOVESWITH NITRILE COATING: Brand: PROTEXIS

## (undated) DEVICE — DRAPE SHEET LG

## (undated) DEVICE — DRAPE SHEET TRANSVERSE LAP

## (undated) DEVICE — Device: Brand: DEFENDO AIR/WATER/SUCTION AND BIOPSY VALVE

## (undated) DEVICE — MINOR GENERAL: Brand: MEDLINE INDUSTRIES, INC.

## (undated) DEVICE — LINE MNTR ADLT SET O2 INTMD

## (undated) DEVICE — SUTURE SILK 2-0 SH

## (undated) NOTE — LETTER
[address] [place] [address]      Authorization for Surgical Operation and Procedure     Date:___________                                                                                                         Time:_______ 4.   Should the need arise during my operation or immediate post-operative period, I also consent to the administration of blood and/or blood products.   Further, I understand that despite careful testing and screening of blood or blood products by anette 8.   I recognize that in the event my procedure results in extended X-Ray/fluoroscopy time, I may develop a skin reaction. 9.  If I have a Do Not Attempt Resuscitation (DNAR) order in place, that status will be suspended while in the operating room, proc STATEMENT OF PHYSICIAN My signature below affirms that prior to the time of the procedure; I have explained to the patient and/or his/her legal representative, the risks and benefits involved in the proposed treatment and any reasonable alternative to the

## (undated) NOTE — LETTER
[address], [place]      Authorization for Surgical Operation and Procedure     Date:___________                                                                                                         Time:_______ 4.   Should the need arise during my operation or immediate post-operative period, I also consent to the administration of blood and/or blood products.   Further, I understand that despite careful testing and screening of blood or blood products by anette 8.   I recognize that in the event my procedure results in extended X-Ray/fluoroscopy time, I may develop a skin reaction. 9.  If I have a Do Not Attempt Resuscitation (DNAR) order in place, that status will be suspended while in the operating room, proc STATEMENT OF PHYSICIAN My signature below affirms that prior to the time of the procedure; I have explained to the patient and/or his/her legal representative, the risks and benefits involved in the proposed treatment and any reasonable alternative to the

## (undated) NOTE — LETTER
[address] [place] [address]      Authorization for Surgical Operation and Procedure     Date:___________                                                                                                         Time:_______ 4.   Should the need arise during my operation or immediate post-operative period, I also consent to the administration of blood and/or blood products.   Further, I understand that despite careful testing and screening of blood or blood products by anette 8.   I recognize that in the event my procedure results in extended X-Ray/fluoroscopy time, I may develop a skin reaction. 9.  If I have a Do Not Attempt Resuscitation (DNAR) order in place, that status will be suspended while in the operating room, proc STATEMENT OF PHYSICIAN My signature below affirms that prior to the time of the procedure; I have explained to the patient and/or his/her legal representative, the risks and benefits involved in the proposed treatment and any reasonable alternative to the

## (undated) NOTE — LETTER
AUTHORIZATION FOR SURGICAL OPERATION OR OTHER PROCEDURE    1. I hereby authorize Dr. Skyler An , and [facility] staff assigned to my case to perform the following operation and/or procedure at the [facility] site:    _______________________________________________________________________________________________    Cortisone Injection left foot   _______________________________________________________________________________________________    2.  My physician has explained the nature and purpose of the operation or other procedure, possible alternative methods of treatment, the risks involved, and the possibility of complication to me.  I acknowledge that no guarantee has been made as to the result that may be obtained.  3.  I recognize that, during the course of this operation, or other procedure, unforseen conditions may necessitate additional or different procedure than those listed above.  I, therefore, further authorize and request that the above named physician, his/her physician assistants or designees perform such procedures as are, in his/her professional opinion, necessary and desirable.  4.  Any tissue or organs removed in the operation or other procedure may be disposed of by and at the discretion of the [facility] and [facility].  5.  I understand that in the event of a medical emergency, I will be transported by local paramedics to [facility] or other hospital emergency department.  6.  I certify that I have read and fully understand the above consent to operation and/or other procedure.    7.  I acknowledge that my physician has explained sedation/analgesia administration to me including the risks and benefits.  I consent to the administration of sedation/analgesia as may be necessary or desirable in the judgement of my physician.    Witness signature: ___________________________________________________ Date:   ______/______/_____                    Time:  ________ A.M.  P.M.       Patient Name:  ______________________________________________________  (please print)      Patient signature:  ___________________________________________________             Relationship to Patient:           []  Parent    Responsible person                          []  Spouse  In case of minor or                    [] Other  _____________   Incompetent name:  __________________________________________________                               (please print)      _____________      Responsible person  In case of minor or  Incompetent signature:  _______________________________________________    Statement of Physician  My signature below affirms that prior to the time of the procedure, I have explained to the patient and/or his/her guardian, the risks and benefits involved in the proposed treatment and any reasonable alternative to the proposed treatment.  I have also explained the risks and benefits involved in the refusal of the proposed treatment and have answered the patient's questions.                        Date:  ______/______/_______  Provider                      Signature:  __________________________________________________________       Time:  ___________ A.M    P.M.

## (undated) NOTE — LETTER
[address]  [address]  [facility] [address]  [phone]  [phone]  Authorization for Imaging Procedure    I hereby authorize Dr. Nate Donato , my physician and his/her assistants (if applicable), which may include medical students, residents, and/or fellows, to perform the following procedure and administer such anesthesia as may be determined necessary by my physician: STEREOTACTIC GUIDED BIOPSY WITH TOMOSYNTHESIS OF THE RIGHT BREAST WITH CLIP PLACEMENT on Mikayla. 2.  I recognize that during the procedure, unforeseen conditions may necessitate additional or different procedures than those listed above. I, therefore, further authorize and request that the above-named physician, assistants, or designees perform such procedures as are, in their judgment, necessary and desirable. 3.  My physician has discussed prior to my procedure the potential benefits, risks and side effects of this procedure; the likelihood of achieving goals; and potential problems that might occur during recuperation. They also discussed reasonable alternatives to the procedure, including risks, benefits, and side effects related to the alternatives and risks related to not receiving this procedure. I have had all my questions answered and I acknowledge that no guarantee has been made as to the result that may be obtained. 4.  Should the need arise during my procedure, which includes change of level of care prior to discharge, I also consent to the administration of blood and/or blood products. Further, I understand that despite careful testing and screening of blood or blood products by collecting agencies, I may still be subject to ill effects as a result of receiving a blood transfusion and/or blood products.  The following are some, but not all, of the potential risks that can occur: fever and allergic reactions, hemolytic reactions, transmission of diseases such as Hepatitis, AIDS and Cytomegalovirus (CMV) and fluid overload. In the event that I wish to have an autologous transfusion of my own blood, or a directed donor transfusion, I will discuss this with my physician. Check only if Refusing Blood or Blood Products  I understand refusal of blood or blood products as deemed necessary by my physician may have serious consequences to my condition to include possible death. I hereby assume responsibility for my refusal and release the hospital, its personnel, and my physicians from any responsibility for the consequences of my refusal.   [  ] Patient Refuses Blood      5. I authorize the use of any specimen, organs, tissues, body parts or foreign objects that may be removed from my body during the procedure for diagnosis, research or teaching purposes and their subsequent disposal by hospital authorities. I also authorize the release of specimen test results and/or written reports to my treating physician on the hospital medical staff or other referring or consulting physicians involved in my care, at the discretion of the Pathologist or my treating physician. 6.  I consent to the photographing or videotaping of the procedures to be performed, including appropriate portions of my body for medical, scientific, or educational purposes, provided my identity is not revealed by the pictures or by descriptive texts accompanying them. If the procedure has been photographed/videotaped, the physician will obtain the original picture, image, videotape or CD. The hospital will not be responsible for storage, release or maintenance of the picture, image, tape or CD.   7.  I consent to the presence of a  or observers in the operating room as deemed necessary by my physician or their designees. 8.  I recognize that in the event my procedure results in extended X-Ray/fluoroscopy time, I may develop a skin reaction. 9.   If I have a Do Not Attempt Resuscitation (DNAR) order in place, that status will be suspended while in the operating room, procedural suite, and during the recovery period unless otherwise explicitly stated by me (or a person authorized to consent on my behalf). The performing physician or my attending physician will determine when the applicable recovery period ends for purposes of reinstating the DNAR order. 10.  I acknowledge that my physician has explained sedation/analgesia administration to me including the risk and benefits I consent to the administration of sedation/analgesia as may be necessary or desirable in the judgment of my physician. I CERTIFY THAT I HAVE READ AND FULLY UNDERSTAND THE ABOVE CONSENT FOR THE PROCEDURE. Signature of Patient: _____________________________________________________________  Responsible person in case of minor, unconscious: ____________________________________  Relationship to patient:  __________________________________________________________  Signature of Witness: _______________________________Date: _________Time: __________    Statement of Physician: My signature below affirms that prior to the time of the procedure, I have explained to the patient and/or her guardian, the risks and benefits involved in the proposed treatment and any reasonable alternative to the proposed treatment. I have also explained the risks and benefits involved in the refusal of the proposed treatment and have answered the patient's questions. If I have a significant financial interest in a co-management agreement or a significant financial interest in any product or implant, or other significant relationship used in the procedure/surgery, I have disclosed this and had a discussion with my patient. Signature of Physician:   _________________________________Date:_____________Time:________    Patient Name: Carmelita Bill : 1955  Printed:  May 8, 2023   Medical Record #: [id number]

## (undated) NOTE — LETTER
AUTHORIZATION FOR SURGICAL OPERATION OR OTHER PROCEDURE    1.  I hereby authorize Dr. Lay Vora, and [facility] staff assigned to my case to perform the following operation and/or procedure at the [facility]:    ________________________________ ________ A. M.  P.M.        Patient Name:  ______________________________________________________  (please print)      Patient signature:  ___________________________________________________             Relationship to Patient:           []  Parent    Respon

## (undated) NOTE — LETTER
[address], [place]  Authorization for Invasive Procedures  1.  I hereby authorize Dr. Annamarie Epsinoza*** , my physician and whomever may be designated as the doctor's assistant, to perform the following operation and/or procedure:  *COLONO allergic reactions, hemolytic reactions, transmission of disease such as hepatitis, AIDS, cytomegalovirus (CMV), and flluid overload.  In the event that I wish to have autologous transfusions of my own blood, or a directed donor transfusion, I will discuss Patient:  ________________________________________________ Date: _________Time: _________    Responsible person in case of minor or unconscious: _____________________________Relationship: ____________     Witness Signature: ________________________________

## (undated) NOTE — LETTER
11/22/2023          To Whom It May Concern:    Bennie Ortiz is currently under my medical care and may return to work at this time. Activity is restricted as follows: patient must wear athletic shoes for 6 weeks . If you require additional information please contact our office.         Sincerely,    Joshua Price DPM

## (undated) NOTE — LETTER
AUTHORIZATION FOR SURGICAL OPERATION OR OTHER PROCEDURE    1. I hereby authorize Dr. Timoteo Sevilla, and [facility] staff assigned to my case to perform the following operation and/or procedure at the [facility]:    _______________________________________________________________________________________________    Cortisone Injection Left Foot  _______________________________________________________________________________________________    2. My physician has explained the nature and purpose of the operation or other procedure, possible alternative methods of treatment, the risks involved, and the possibility of complication to me. I acknowledge that no guarantee has been made as to the result that may be obtained. 3.  I recognize that, during the course of this operation, or other procedure, unforseen conditions may necessitate additional or different procedure than those listed above. I, therefore, further authorize and request that the above named physician, his/her physician assistants or designees perform such procedures as are, in his/her professional opinion, necessary and desirable. 4.  Any tissue or organs removed in the operation or other procedure may be disposed of by and at the discretion of the [facility] and [facility] [place][note 2]. 5.  I understand that in the event of a medical emergency, I will be transported by local paramedics to [facility] or other hospital emergency department. 6.  I certify that I have read and fully understand the above consent to operation and/or other procedure. 7.  I acknowledge that my physician has explained sedation/analgesia administration to me including the risks and benefits. I consent to the administration of sedation/analgesia as may be necessary or desirable in the judgement of my physician.     Witness signature: ___________________________________________________ Date:  ______/______/_____ Time:  ________ A. M.  P.M. Patient Name:  ______________________________________________________  (please print)      Patient signature:  ___________________________________________________             Relationship to Patient:           []  Parent    Responsible person                          []  Spouse  In case of minor or                    [] Other  _____________   Incompetent name:  __________________________________________________                               (please print)      _____________      Responsible person  In case of minor or  Incompetent signature:  _______________________________________________    Statement of Physician  My signature below affirms that prior to the time of the procedure, I have explained to the patient and/or his/her guardian, the risks and benefits involved in the proposed treatment and any reasonable alternative to the proposed treatment. I have also explained the risks and benefits involved in the refusal of the proposed treatment and have answered the patient's questions.                         Date:  ______/______/_______  Provider                      Signature:  __________________________________________________________       Time:  ___________ A.M    P.M.

## (undated) NOTE — LETTER
[facility]  Administration of Anesthesia  1. I, Ender Bhatt, or _________________________________ acting on her behalf, (Patient) (Dependent/Representative) request to receive anesthesia for my pending procedure/operation/treatment.   A karl bleeding, seizure, cardiac arrest and death. 7. AWARENESS: I understand that it is possible (but unlikely) to have explicit memory of events from the operating room while under general anesthesia.   8. ELECTROCONVULSIVE THERAPY PATIENTS: This consent serve below affirms that prior to the time of the procedure, I have explained to the patient and/or his/her guardian, the risks and benefits of undergoing anesthesia, as well as any reasonable alternatives.     ___________________________________________________

## (undated) NOTE — LETTER
AUTHORIZATION FOR SURGICAL OPERATION OR OTHER PROCEDURE    1.  I hereby authorize Dr. Cherylene Allegra  and [facility], [facility] staff assigned to my case to perform the following operation and/or procedure at the [facility]:    Cortisone injection in L Time:  ________ A. M.  P.M.        Patient Name:  ______________________________________________________  (please print)      Patient signature:  ___________________________________________________             Relationship to Patient:           []  Claritza Osorior

## (undated) NOTE — LETTER
AUTHORIZATION FOR SURGICAL OPERATION OR OTHER PROCEDURE    1.  I hereby authorize Dr. Melia Sena  and [facility] staff assigned to my case to perform the following operation and/or procedure at the [facility]:    Cortisone injection in L Time:  ________ A. M.  P.M.        Patient Name:  ______________________________________________________  (please print)      Patient signature:  ___________________________________________________             Relationship to Patient:           []  Lyndle Ballesteros